# Patient Record
Sex: FEMALE | Race: WHITE | NOT HISPANIC OR LATINO
[De-identification: names, ages, dates, MRNs, and addresses within clinical notes are randomized per-mention and may not be internally consistent; named-entity substitution may affect disease eponyms.]

---

## 2017-12-20 ENCOUNTER — APPOINTMENT (OUTPATIENT)
Dept: PEDIATRIC NEUROLOGY | Facility: CLINIC | Age: 22
End: 2017-12-20
Payer: COMMERCIAL

## 2017-12-20 VITALS
HEIGHT: 64.17 IN | HEART RATE: 101 BPM | BODY MASS INDEX: 23.95 KG/M2 | DIASTOLIC BLOOD PRESSURE: 74 MMHG | SYSTOLIC BLOOD PRESSURE: 109 MMHG | WEIGHT: 140.28 LBS

## 2017-12-20 DIAGNOSIS — Z87.898 PERSONAL HISTORY OF OTHER SPECIFIED CONDITIONS: ICD-10-CM

## 2017-12-20 DIAGNOSIS — M25.512 PAIN IN LEFT SHOULDER: ICD-10-CM

## 2017-12-20 DIAGNOSIS — R76.8 OTHER SPECIFIED ABNORMAL IMMUNOLOGICAL FINDINGS IN SERUM: ICD-10-CM

## 2017-12-20 PROCEDURE — 99205 OFFICE O/P NEW HI 60 MIN: CPT

## 2017-12-20 NOTE — REASON FOR VISIT
[Seizure] : seizure [Seizure Disorder] : seizure disorder [Initial Consultation] : an initial consultation for

## 2017-12-20 NOTE — CONSULT LETTER
[FreeTextEntry2] : Dear ,\par \par I had the pleasure of evaluating NIC PARISI in the epilepsy/sleep clinic on Dec 20, 2017 for the problem of possible seizures/sleep problems as a new patient NIC PARISI, who is a 22 year right handed girl . Although  her history is well known to you, please allow me to reiterate it for the purpose of our medical records.  NIC PARISI is accompanied to today's clinic visit by her  parents. Today I spent 30 minutes reviewing information from previous records. \par \par Please see my note below\par  [FreeTextEntry3] : Thank you for allowing me to participate in NIC care. Today I spent 60 minutes with NIC and her family, including > 50% of this visit on counseling and coordinating care. Please do not hesitate to contact me in case of questions.\par \par Sincerely, \par \par \par Perry Carvajal MD\par Chief, Pediatric Neurology\par Co-Director (Neurology), Pediatric Sleep Program\par [facility]\par Professor of Pediatrics & Neurology at [facility] [employer] [facility]\par \par

## 2017-12-20 NOTE — HISTORY OF PRESENT ILLNESS
[FreeTextEntry1] : seizures started at age 9 with absences, responded well to zarontin. Treatment stopped 3 years later. Seizures returned with tonic clonic 2 years later. Depakotte and zarontin restarted. Later zarontin stopped, Depakote switched to Lamictal with good response. later, keppra added for catamenial worsening of seizures, day 21-28. Keppra stopped for side effects. Zonegran started. Currently seizure free for 14 months with this combination.

## 2017-12-20 NOTE — ASSESSMENT
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 Only responds to brand medications with breakthrough on generics.

## 2017-12-21 LAB
25(OH)D3 SERPL-MCNC: 19.4 NG/ML
ALBUMIN SERPL ELPH-MCNC: 4.7 G/DL
ALP BLD-CCNC: 47 U/L
ALT SERPL-CCNC: 8 U/L
ANION GAP SERPL CALC-SCNC: 13 MMOL/L
AST SERPL-CCNC: 12 U/L
BASOPHILS # BLD AUTO: 0.02 K/UL
BASOPHILS NFR BLD AUTO: 0.5 %
BILIRUB SERPL-MCNC: 0.2 MG/DL
BUN SERPL-MCNC: 11 MG/DL
CALCIUM SERPL-MCNC: 9.8 MG/DL
CHLORIDE SERPL-SCNC: 103 MMOL/L
CO2 SERPL-SCNC: 25 MMOL/L
CREAT SERPL-MCNC: 0.93 MG/DL
EOSINOPHIL # BLD AUTO: 0.09 K/UL
EOSINOPHIL NFR BLD AUTO: 2.1 %
GLUCOSE BS SERPL-MCNC: 72 MG/DL
HCT VFR BLD CALC: 38.9 %
HGB BLD-MCNC: 12.5 G/DL
IMM GRANULOCYTES NFR BLD AUTO: 0.2 %
LYMPHOCYTES # BLD AUTO: 1.19 K/UL
LYMPHOCYTES NFR BLD AUTO: 27.7 %
MAN DIFF?: NORMAL
MCHC RBC-ENTMCNC: 28.5 PG
MCHC RBC-ENTMCNC: 32.1 GM/DL
MCV RBC AUTO: 88.8 FL
MONOCYTES # BLD AUTO: 0.29 K/UL
MONOCYTES NFR BLD AUTO: 6.8 %
NEUTROPHILS # BLD AUTO: 2.69 K/UL
NEUTROPHILS NFR BLD AUTO: 62.7 %
PLATELET # BLD AUTO: 251 K/UL
POTASSIUM SERPL-SCNC: 4.3 MMOL/L
PROT SERPL-MCNC: 7.5 G/DL
RBC # BLD: 4.38 M/UL
RBC # FLD: 13.9 %
SODIUM SERPL-SCNC: 141 MMOL/L
WBC # FLD AUTO: 4.29 K/UL

## 2017-12-22 LAB
LAMOTRIGINE SERPL-MCNC: 10.7 MCG/ML
ZONISAMIDE SERPL-MCNC: 32 MCG/ML

## 2017-12-23 ENCOUNTER — TRANSCRIPTION ENCOUNTER (OUTPATIENT)
Age: 22
End: 2017-12-23

## 2018-06-21 ENCOUNTER — APPOINTMENT (OUTPATIENT)
Dept: PEDIATRIC NEUROLOGY | Facility: CLINIC | Age: 23
End: 2018-06-21
Payer: COMMERCIAL

## 2018-06-21 VITALS
BODY MASS INDEX: 23.32 KG/M2 | HEIGHT: 64.96 IN | WEIGHT: 139.99 LBS | DIASTOLIC BLOOD PRESSURE: 81 MMHG | HEART RATE: 101 BPM | SYSTOLIC BLOOD PRESSURE: 121 MMHG

## 2018-06-21 PROCEDURE — 99214 OFFICE O/P EST MOD 30 MIN: CPT

## 2018-06-21 RX ORDER — ZONISAMIDE 100 MG/1
100 CAPSULE ORAL
Refills: 0 | Status: DISCONTINUED | COMMUNITY
End: 2018-06-21

## 2018-06-21 NOTE — QUALITY MEASURES
[Seizure frequency] : Seizure frequency assessed: Yes [Etiology, seizure type, and epilepsy syndrome] : Etiology, seizure type, and epilepsy syndrome assessed: Yes [Side effects of anti-seizure medications] : Side effects of anti-seizure medications assessed: Yes [Safety and education around seizures] : Safety and education around seizures assessed: Yes [Issues around driving (when/if applicable)] : Issues around driving assessed: Yes [Screening for anxiety, depression] : Screening for anxiety, depression assessed: Yes [Treatment-resistant epilepsy (assessed every visit)] : Treatment-resistant epilepsy assessed every visit: Yes [Counseling for women of childbearing potential with epilepsy (including folic acid supplement) done] : Counseling for women of childbearing potential with epilepsy (including folic acid supplement) done: Yes

## 2018-06-21 NOTE — PHYSICAL EXAM
[Normal] : patient has a normal gait including toe-walking, heel-walking and tandem walking. Romberg sign is negative. [de-identified] : see HPI

## 2018-06-21 NOTE — ASSESSMENT
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 Only responds to brand medications and has had breakthrough seizures on generics. Has good seizure control on current meds, no seizures since 10/2016. Non focal neuro exam, developing normally.

## 2018-06-21 NOTE — HISTORY OF PRESENT ILLNESS
[FreeTextEntry1] : Shital is a 23 year old girl here for seizure follow up. Last seizure was on 10/5/2016. currently on Lamictal  mg in AM and Zarontin 400 mg at night. No side effects reported. \par \par Seizure Hx:\par Seizures started at age 9 with absences, responded well to Zarontin. Treatment stopped 3 years later. Seizures returned with tonic clonic 2 years later. Depakote and Zarontin restarted. Later Zarontin stopped, Depakote switched to Lamictal with good response. later, Keppra added for catamenial worsening of seizures, day 21-28. Keppra stopped for side effects. Zonegran started.

## 2018-06-21 NOTE — CONSULT LETTER
[FreeTextEntry2] : Dear ,\par \par I had the pleasure of evaluating NIC PARISI in the epilepsy/sleep clinic on Dec 20, 2017 for the problem of possible seizures/sleep problems as a new patient NIC PARISI, who is a 22 year right handed girl . Although  her history is well known to you, please allow me to reiterate it for the purpose of our medical records.  NIC PARISI is accompanied to today's clinic visit by her  parents. Today I spent 30 minutes reviewing information from previous records. \par \par Please see my note below\par  [FreeTextEntry3] : RITA Arriaza\par Certified Pedicatric Nurse Practitioner\par Pediatric Neurology\par \par Perry Carvajal MD, FAAN, FAASM\par Director, Division of Pediatric Neurology\par Co-Director, Sleep Program for Children (Neurology)\par [facility]\par \par Professor of Pediatrics & Neurology\par [facility] [employer] [facility]\par \par Director, Pediatric Neurology Service Line\par [facility]\par

## 2018-06-21 NOTE — REASON FOR VISIT
[Follow-Up Evaluation] : a follow-up evaluation for [Seizure] : seizure [Seizure Disorder] : seizure disorder [Patient] : patient [Mother] : mother [Medical Records] : medical records

## 2018-06-22 LAB
25(OH)D3 SERPL-MCNC: 23.3 NG/ML
ALBUMIN SERPL ELPH-MCNC: 4.6 G/DL
ALP BLD-CCNC: 46 U/L
ALT SERPL-CCNC: 17 U/L
ANION GAP SERPL CALC-SCNC: 15 MMOL/L
AST SERPL-CCNC: 20 U/L
BASOPHILS # BLD AUTO: 0.01 K/UL
BASOPHILS NFR BLD AUTO: 0.2 %
BILIRUB SERPL-MCNC: 0.2 MG/DL
BUN SERPL-MCNC: 14 MG/DL
CALCIUM SERPL-MCNC: 9.9 MG/DL
CHLORIDE SERPL-SCNC: 107 MMOL/L
CO2 SERPL-SCNC: 22 MMOL/L
CREAT SERPL-MCNC: 0.95 MG/DL
EOSINOPHIL # BLD AUTO: 0.08 K/UL
EOSINOPHIL NFR BLD AUTO: 1.9 %
FERRITIN SERPL-MCNC: 19 NG/ML
HCT VFR BLD CALC: 38.4 %
HGB BLD-MCNC: 12.3 G/DL
IMM GRANULOCYTES NFR BLD AUTO: 0.2 %
LYMPHOCYTES # BLD AUTO: 1.21 K/UL
LYMPHOCYTES NFR BLD AUTO: 28.4 %
MAN DIFF?: NORMAL
MCHC RBC-ENTMCNC: 28.6 PG
MCHC RBC-ENTMCNC: 32 GM/DL
MCV RBC AUTO: 89.3 FL
MONOCYTES # BLD AUTO: 0.24 K/UL
MONOCYTES NFR BLD AUTO: 5.6 %
NEUTROPHILS # BLD AUTO: 2.71 K/UL
NEUTROPHILS NFR BLD AUTO: 63.7 %
PLATELET # BLD AUTO: 243 K/UL
POTASSIUM SERPL-SCNC: 4.7 MMOL/L
PROT SERPL-MCNC: 7.1 G/DL
RBC # BLD: 4.3 M/UL
RBC # FLD: 14.6 %
SODIUM SERPL-SCNC: 144 MMOL/L
WBC # FLD AUTO: 4.26 K/UL

## 2018-06-25 ENCOUNTER — OTHER (OUTPATIENT)
Age: 23
End: 2018-06-25

## 2018-06-25 LAB
LAMOTRIGINE SERPL-MCNC: 11.1 MCG/ML
ZONISAMIDE SERPL-MCNC: 28 MCG/ML

## 2018-07-17 ENCOUNTER — RESULT REVIEW (OUTPATIENT)
Age: 23
End: 2018-07-17

## 2018-07-24 ENCOUNTER — RX RENEWAL (OUTPATIENT)
Age: 23
End: 2018-07-24

## 2018-07-30 ENCOUNTER — APPOINTMENT (OUTPATIENT)
Dept: PEDIATRIC NEUROLOGY | Facility: CLINIC | Age: 23
End: 2018-07-30

## 2018-08-23 ENCOUNTER — APPOINTMENT (OUTPATIENT)
Dept: PEDIATRIC NEUROLOGY | Facility: CLINIC | Age: 23
End: 2018-08-23
Payer: COMMERCIAL

## 2018-08-23 PROCEDURE — 95816 EEG AWAKE AND DROWSY: CPT

## 2018-09-06 ENCOUNTER — RESULT REVIEW (OUTPATIENT)
Age: 23
End: 2018-09-06

## 2018-12-20 ENCOUNTER — APPOINTMENT (OUTPATIENT)
Dept: PEDIATRIC NEUROLOGY | Facility: CLINIC | Age: 23
End: 2018-12-20

## 2019-01-25 ENCOUNTER — RX RENEWAL (OUTPATIENT)
Age: 24
End: 2019-01-25

## 2019-01-25 ENCOUNTER — MEDICATION RENEWAL (OUTPATIENT)
Age: 24
End: 2019-01-25

## 2019-03-07 ENCOUNTER — APPOINTMENT (OUTPATIENT)
Dept: PEDIATRIC NEUROLOGY | Facility: CLINIC | Age: 24
End: 2019-03-07
Payer: COMMERCIAL

## 2019-03-07 VITALS
BODY MASS INDEX: 23.66 KG/M2 | DIASTOLIC BLOOD PRESSURE: 81 MMHG | HEIGHT: 64.96 IN | HEART RATE: 116 BPM | SYSTOLIC BLOOD PRESSURE: 117 MMHG | WEIGHT: 142 LBS

## 2019-03-07 PROCEDURE — 99214 OFFICE O/P EST MOD 30 MIN: CPT

## 2019-03-08 LAB
25(OH)D3 SERPL-MCNC: 10.1 NG/ML
ALBUMIN SERPL ELPH-MCNC: 5 G/DL
ALP BLD-CCNC: 53 U/L
ALT SERPL-CCNC: 15 U/L
ANION GAP SERPL CALC-SCNC: 15 MMOL/L
AST SERPL-CCNC: 16 U/L
BASOPHILS # BLD AUTO: 0.03 K/UL
BASOPHILS NFR BLD AUTO: 0.6 %
BILIRUB SERPL-MCNC: 0.2 MG/DL
BUN SERPL-MCNC: 10 MG/DL
CALCIUM SERPL-MCNC: 10.3 MG/DL
CHLORIDE SERPL-SCNC: 104 MMOL/L
CO2 SERPL-SCNC: 20 MMOL/L
CREAT SERPL-MCNC: 1.07 MG/DL
EOSINOPHIL # BLD AUTO: 0.07 K/UL
EOSINOPHIL NFR BLD AUTO: 1.5 %
FERRITIN SERPL-MCNC: 62 NG/ML
HCT VFR BLD CALC: 43.2 %
HGB BLD-MCNC: 13.5 G/DL
IMM GRANULOCYTES NFR BLD AUTO: 0.4 %
LYMPHOCYTES # BLD AUTO: 1.56 K/UL
LYMPHOCYTES NFR BLD AUTO: 32.6 %
MAN DIFF?: NORMAL
MCHC RBC-ENTMCNC: 27.9 PG
MCHC RBC-ENTMCNC: 31.3 GM/DL
MCV RBC AUTO: 89.3 FL
MONOCYTES # BLD AUTO: 0.45 K/UL
MONOCYTES NFR BLD AUTO: 9.4 %
NEUTROPHILS # BLD AUTO: 2.65 K/UL
NEUTROPHILS NFR BLD AUTO: 55.5 %
PLATELET # BLD AUTO: 330 K/UL
POTASSIUM SERPL-SCNC: 4.4 MMOL/L
PROT SERPL-MCNC: 7.4 G/DL
RBC # BLD: 4.84 M/UL
RBC # FLD: 14 %
SODIUM SERPL-SCNC: 139 MMOL/L
WBC # FLD AUTO: 4.78 K/UL

## 2019-03-08 NOTE — HISTORY OF PRESENT ILLNESS
[Headache] : headache [FreeTextEntry1] : Shital is a 23 year old girl here for seizure follow up and headaches/ vomiting. Last seizure was on 10/5/2016. Currently on Lamictal  mg in AM (takes it at 2:00 pm) and Zonegran 400 mg at night. No side effects reported. \par For past 3 months she has been getting dizzy for about 15 minutes and then starts vomiting at night right before taking the Zonegran after dinner. Mother gives Zofran but it takes 2 doses within an hour before it will stop. She will get a headache after vomiting. This has been occurring once monthly for past 3 months and did occur 1.5 years ago as well. She will be up all night vomiting on the days it occurs. Episodes occur around her menstrual period.\par \par \par Seizure Hx:\par Seizures started at age 9 with absences, responded well to Zarontin. Treatment stopped 3 years later. Seizures returned with tonic clonic 2 years later. Depakote and Zarontin restarted. Later Zarontin stopped, Depakote switched to Lamictal with good response. later, Keppra added for catamenial worsening of seizures, day 21-28. Keppra stopped for side effects. Zonegran started. [de-identified] : occasional with vomiting episodes

## 2019-03-08 NOTE — PHYSICAL EXAM

## 2019-03-08 NOTE — QUALITY MEASURES
[Seizure frequency] : Seizure frequency: Yes [Etiology, seizure type, and epilepsy syndrome] : Etiology, seizure type, and epilepsy syndrome: Yes [Side effects of anti-seizure medications] : Side effects of anti-seizure medications: Yes [Safety and education around seizures] : Safety and education around seizures: Yes [Issues around driving] : Issues around driving: Yes [Screening for anxiety, depression] : Screening for anxiety, depression: Yes [Treatment-resistant epilepsy (every visit)] : Treatment-resistant epilepsy (every visit): Yes [Adherence to medication(s)] : Adherence to medication(s): Yes [Counseling for women of childbearing potential with epilepsy (including folic acid supplement)] : Counseling for women of childbearing potential with epilepsy (including folic acid supplement): Yes [25 Hydroxy Vitamin D level assessed and Vitamin D3 ordered] : 25 Hydroxy Vitamin D level assessed and Vitamin D3 ordered: Yes [Classification of primary headache syndrome based on latest version of International Classification of  Headache Disorders was performed] : Classification of primary headache syndrome based on latest version of International Classification of Headache Disorders was performed: Yes [Functional disability based on clinical history and/or age appropriate disability scale assessed] : Functional disability based on clinical history and/or age appropriate disability scale assessed: Yes [Overuse of OTC and prescribed analgesics assessed] : Overuse of OTC and prescribed analgesics assessed: Yes [Lifestyle factors including diet, exercise and sleep hygiene discussed] : Lifestyle factors including diet, exercise and sleep hygiene discussed: Yes [Referral to behavioral health for frequent headaches discussed] : Referral to behavioral health for frequent headaches discussed: Yes [Treatment plan for headache including  pharmacological (abortive and preventive) and nonpharmacological (nutraceutical and bio-behavioral) interventions] : Treatment plan for headache including  pharmacological (abortive and preventive) and nonpharmacological (nutraceutical and bio-behavioral) interventions: Yes [Options for adjunctive therapy (Neurostimulation, CBD, Dietary Therapy, Epilepsy Surgery)] : Options for adjunctive therapy (Neurostimulation, CBD, Dietary Therapy, Epilepsy Surgery): Not Applicable

## 2019-03-08 NOTE — CONSULT LETTER
[Courtesy Letter:] : I had the pleasure of seeing your patient, [unfilled], in my office today. [Please see my note below.] : Please see my note below. [Sincerely,] : Sincerely, [FreeTextEntry3] : RITA Arriaza\par Certified Pedicatric Nurse Practitioner\par Pediatric Neurology\par \par Perry Carvajal MD, FAAN, FAASM\par Director, Division of Pediatric Neurology\par Co-Director, Sleep Program for Children (Neurology)\par [facility]\par \par Professor of Pediatrics & Neurology\par [facility] [employer] [facility]\par \par Director, Pediatric Neurology Service Line\par [facility]\par

## 2019-03-11 ENCOUNTER — CLINICAL ADVICE (OUTPATIENT)
Age: 24
End: 2019-03-11

## 2019-03-12 LAB
LAMOTRIGINE SERPL-MCNC: 11.5 MCG/ML
ZONISAMIDE SERPL-MCNC: 32 MCG/ML

## 2019-03-19 ENCOUNTER — MEDICATION RENEWAL (OUTPATIENT)
Age: 24
End: 2019-03-19

## 2019-03-21 ENCOUNTER — RX RENEWAL (OUTPATIENT)
Age: 24
End: 2019-03-21

## 2019-04-29 ENCOUNTER — MEDICATION RENEWAL (OUTPATIENT)
Age: 24
End: 2019-04-29

## 2019-08-02 ENCOUNTER — MOBILE ON CALL (OUTPATIENT)
Age: 24
End: 2019-08-02

## 2019-08-02 ENCOUNTER — MEDICATION RENEWAL (OUTPATIENT)
Age: 24
End: 2019-08-02

## 2019-08-26 ENCOUNTER — APPOINTMENT (OUTPATIENT)
Dept: PEDIATRIC NEUROLOGY | Facility: CLINIC | Age: 24
End: 2019-08-26
Payer: COMMERCIAL

## 2019-08-26 ENCOUNTER — APPOINTMENT (OUTPATIENT)
Dept: PEDIATRIC NEUROLOGY | Facility: CLINIC | Age: 24
End: 2019-08-26

## 2019-08-26 VITALS
SYSTOLIC BLOOD PRESSURE: 117 MMHG | BODY MASS INDEX: 21.99 KG/M2 | HEART RATE: 92 BPM | WEIGHT: 132 LBS | DIASTOLIC BLOOD PRESSURE: 79 MMHG | HEIGHT: 64.96 IN

## 2019-08-26 DIAGNOSIS — G43.909 MIGRAINE, UNSPECIFIED, NOT INTRACTABLE, W/OUT STATUS MIGRAINOSUS: ICD-10-CM

## 2019-08-26 DIAGNOSIS — R42 DIZZINESS AND GIDDINESS: ICD-10-CM

## 2019-08-26 DIAGNOSIS — R11.2 NAUSEA WITH VOMITING, UNSPECIFIED: ICD-10-CM

## 2019-08-26 PROCEDURE — 99214 OFFICE O/P EST MOD 30 MIN: CPT

## 2019-08-26 RX ORDER — ZONISAMIDE 100 MG/1
100 CAPSULE ORAL
Qty: 120 | Refills: 5 | Status: ACTIVE | COMMUNITY
Start: 2017-12-20 | End: 1900-01-01

## 2019-08-26 NOTE — BIRTH HISTORY
[At Term] : at term [Normal Vaginal Route] : by normal vaginal route [place] [Age Appropriate] : age appropriate developmental milestones met [None] : there were no delivery complications

## 2019-08-27 PROBLEM — R42 DIZZINESS: Status: ACTIVE | Noted: 2019-03-08

## 2019-08-27 PROBLEM — R11.2 NAUSEA AND VOMITING: Status: ACTIVE | Noted: 2019-03-08

## 2019-08-27 PROBLEM — G43.909 MIGRAINE: Status: ACTIVE | Noted: 2019-03-08

## 2019-08-27 LAB
25(OH)D3 SERPL-MCNC: 24.3 NG/ML
ALBUMIN SERPL ELPH-MCNC: 4.8 G/DL
ALP BLD-CCNC: 49 U/L
ALT SERPL-CCNC: 15 U/L
ANION GAP SERPL CALC-SCNC: 13 MMOL/L
AST SERPL-CCNC: 12 U/L
BASOPHILS # BLD AUTO: 0.01 K/UL
BASOPHILS NFR BLD AUTO: 0.2 %
BILIRUB SERPL-MCNC: <0.2 MG/DL
BUN SERPL-MCNC: 10 MG/DL
CALCIUM SERPL-MCNC: 10.3 MG/DL
CHLORIDE SERPL-SCNC: 104 MMOL/L
CHOLEST SERPL-MCNC: 199 MG/DL
CHOLEST/HDLC SERPL: 4.2 RATIO
CO2 SERPL-SCNC: 22 MMOL/L
CREAT SERPL-MCNC: 0.96 MG/DL
EOSINOPHIL # BLD AUTO: 0.05 K/UL
EOSINOPHIL NFR BLD AUTO: 1.1 %
HCT VFR BLD CALC: 41.5 %
HDLC SERPL-MCNC: 48 MG/DL
HGB BLD-MCNC: 13.2 G/DL
IMM GRANULOCYTES NFR BLD AUTO: 0.2 %
LDLC SERPL CALC-MCNC: 132 MG/DL
LYMPHOCYTES # BLD AUTO: 1.44 K/UL
LYMPHOCYTES NFR BLD AUTO: 30.4 %
MAN DIFF?: NORMAL
MCHC RBC-ENTMCNC: 29.1 PG
MCHC RBC-ENTMCNC: 31.8 GM/DL
MCV RBC AUTO: 91.4 FL
MONOCYTES # BLD AUTO: 0.35 K/UL
MONOCYTES NFR BLD AUTO: 7.4 %
NEUTROPHILS # BLD AUTO: 2.87 K/UL
NEUTROPHILS NFR BLD AUTO: 60.7 %
PLATELET # BLD AUTO: 248 K/UL
POTASSIUM SERPL-SCNC: 4.6 MMOL/L
PROT SERPL-MCNC: 7.4 G/DL
RBC # BLD: 4.54 M/UL
RBC # FLD: 14.1 %
SODIUM SERPL-SCNC: 139 MMOL/L
TRIGL SERPL-MCNC: 95 MG/DL
WBC # FLD AUTO: 4.73 K/UL

## 2019-08-27 NOTE — CONSULT LETTER
[Sincerely,] : Sincerely, [Courtesy Letter:] : I had the pleasure of seeing your patient, [unfilled], in my office today. [Please see my note below.] : Please see my note below. [FreeTextEntry3] : RITA Arriaza\par Certified Pedicatric Nurse Practitioner\par Pediatric Neurology\par \par Perry Carvajal MD, FAAN, FAASM\par Director, Division of Pediatric Neurology\par Co-Director, Sleep Program for Children (Neurology)\par [facility]\par \par Professor of Pediatrics & Neurology\par [facility] [employer] [facility]\par \par Director, Pediatric Neurology Service Line\par [facility]\par

## 2019-08-27 NOTE — ASSESSMENT
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 Only responds to brand medications and has had breakthrough seizures on generics. Has good seizure control on current meds, no seizures since 10/2016. She has been having almost monthly migraines along with vomiting and aura. Non focal neuro exam, developing normally.

## 2019-08-27 NOTE — HISTORY OF PRESENT ILLNESS
[Headache] : headache [FreeTextEntry1] : Shital is a 23 year old girl here for seizure follow up and headaches/ vomiting. Last seizure was on 10/5/2016. Currently on Lamictal  mg in AM and Zonegran 400 mg at night. No side effects reported. \miranda Has not had an episode of dizziness and vomiting for two of her cycles since last visit which was in March 2019.\par Frovatriptan along with Zofran helps her feel better with a migraine that occurs by her cycle.\par no seizure since Oct 2016\par \par For past 6 months she has been getting dizzy for about 15 minutes and then starts vomiting at night right before taking the Zonegran after dinner. Mother gives Zofran but it takes 2 doses within an hour before it will stop. She will get a headache after vomiting. This has been occurring once monthly for past 3 months and did occur 1.5 years ago as well. She will be up all night vomiting on the days it occurs. Episodes occur around her menstrual period.\par \par \par Seizure Hx:\par Seizures started at age 9 with absences, responded well to Zarontin. Treatment stopped 3 years later. Seizures returned with tonic clonic 2 years later. Depakote and Zarontin restarted. Later Zarontin stopped, Depakote switched to Lamictal with good response. later, Keppra added for catamenial worsening of seizures, day 21-28. Keppra stopped for side effects. Zonegran started. [de-identified] : occasional with vomiting episodes

## 2019-08-27 NOTE — PHYSICAL EXAM
[Cranial Nerves Optic (II)] : visual acuity intact bilaterally,  visual fields full to confrontation, pupils equal round and reactive to light [Cranial Nerves Oculomotor (III)] : extraocular motion intact [Cranial Nerves Trigeminal (V)] : facial sensation intact symmetrically [Cranial Nerves Facial (VII)] : face symmetrical [Cranial Nerves Vestibulocochlear (VIII)] : hearing was intact bilaterally [Cranial Nerves Accessory (XI - Cranial And Spinal)] : head turning and shoulder shrug symmetric [Cranial Nerves Glossopharyngeal (IX)] : tongue and palate midline [Cranial Nerves Hypoglossal (XII)] : there was no tongue deviation with protrusion [Normal] : patient has a normal gait including toe-walking, heel-walking and tandem walking. Romberg sign is negative. [de-identified] : see HPI

## 2019-08-27 NOTE — QUALITY MEASURES
[Classification of primary headache syndrome based on latest version of International Classification of  Headache Disorders was performed] : Classification of primary headache syndrome based on latest version of International Classification of Headache Disorders was performed: Yes [Overuse of OTC and prescribed analgesics assessed] : Overuse of OTC and prescribed analgesics assessed: Yes [Lifestyle factors including diet, exercise and sleep hygiene discussed] : Lifestyle factors including diet, exercise and sleep hygiene discussed: Yes [Treatment plan for headache including  pharmacological (abortive and preventive) and nonpharmacological (nutraceutical and bio-behavioral) interventions] : Treatment plan for headache including  pharmacological (abortive and preventive) and nonpharmacological (nutraceutical and bio-behavioral) interventions: Yes [Referral to behavioral health for frequent headaches discussed] : Referral to behavioral health for frequent headaches discussed: Yes [Seizure frequency] : Seizure frequency: Yes [Etiology, seizure type, and epilepsy syndrome] : Etiology, seizure type, and epilepsy syndrome: Yes [Safety and education around seizures] : Safety and education around seizures: Yes [Issues around driving] : Issues around driving: Yes [Side effects of anti-seizure medications] : Side effects of anti-seizure medications: Yes [Treatment-resistant epilepsy (every visit)] : Treatment-resistant epilepsy (every visit): Yes [Screening for anxiety, depression] : Screening for anxiety, depression: Yes [Counseling for women of childbearing potential with epilepsy (including folic acid supplement)] : Counseling for women of childbearing potential with epilepsy (including folic acid supplement): Yes [Adherence to medication(s)] : Adherence to medication(s): Yes [25 Hydroxy Vitamin D level assessed and Vitamin D3 ordered] : 25 Hydroxy Vitamin D level assessed and Vitamin D3 ordered: Yes [Functional disability based on clinical history and/or age appropriate disability scale assessed] : Functional disability based on clinical history and/or age appropriate disability scale assessed: Yes [Options for adjunctive therapy (Neurostimulation, CBD, Dietary Therapy, Epilepsy Surgery)] : Options for adjunctive therapy (Neurostimulation, CBD, Dietary Therapy, Epilepsy Surgery): Not Applicable

## 2019-08-28 ENCOUNTER — TRANSCRIPTION ENCOUNTER (OUTPATIENT)
Age: 24
End: 2019-08-28

## 2019-08-28 LAB
LAMOTRIGINE SERPL-MCNC: 11.5 MCG/ML
ZONISAMIDE SERPL-MCNC: 32 MCG/ML

## 2020-01-10 ENCOUNTER — RX RENEWAL (OUTPATIENT)
Age: 25
End: 2020-01-10

## 2020-02-25 ENCOUNTER — APPOINTMENT (OUTPATIENT)
Dept: PEDIATRIC NEUROLOGY | Facility: CLINIC | Age: 25
End: 2020-02-25

## 2020-12-15 ENCOUNTER — EMERGENCY (EMERGENCY)
Facility: HOSPITAL | Age: 25
LOS: 1 days | End: 2020-12-15
Attending: EMERGENCY MEDICINE
Payer: COMMERCIAL

## 2020-12-15 VITALS
TEMPERATURE: 98 F | DIASTOLIC BLOOD PRESSURE: 90 MMHG | RESPIRATION RATE: 20 BRPM | HEART RATE: 126 BPM | HEIGHT: 65 IN | WEIGHT: 139.99 LBS | SYSTOLIC BLOOD PRESSURE: 143 MMHG | OXYGEN SATURATION: 99 %

## 2020-12-15 LAB
ALBUMIN SERPL ELPH-MCNC: 4.9 G/DL — SIGNIFICANT CHANGE UP (ref 3.3–5)
ALP SERPL-CCNC: 58 U/L — SIGNIFICANT CHANGE UP (ref 40–120)
ALT FLD-CCNC: 9 U/L — LOW (ref 10–45)
ANION GAP SERPL CALC-SCNC: 14 MMOL/L — SIGNIFICANT CHANGE UP (ref 5–17)
APPEARANCE UR: CLEAR — SIGNIFICANT CHANGE UP
APTT BLD: 28.3 SEC — SIGNIFICANT CHANGE UP (ref 27.5–35.5)
AST SERPL-CCNC: 15 U/L — SIGNIFICANT CHANGE UP (ref 10–40)
BASOPHILS # BLD AUTO: 0.02 K/UL — SIGNIFICANT CHANGE UP (ref 0–0.2)
BASOPHILS NFR BLD AUTO: 0.2 % — SIGNIFICANT CHANGE UP (ref 0–2)
BILIRUB SERPL-MCNC: 0.1 MG/DL — LOW (ref 0.2–1.2)
BILIRUB UR-MCNC: NEGATIVE — SIGNIFICANT CHANGE UP
BUN SERPL-MCNC: 11 MG/DL — SIGNIFICANT CHANGE UP (ref 7–23)
CALCIUM SERPL-MCNC: 10.2 MG/DL — SIGNIFICANT CHANGE UP (ref 8.4–10.5)
CHLORIDE SERPL-SCNC: 104 MMOL/L — SIGNIFICANT CHANGE UP (ref 96–108)
CO2 SERPL-SCNC: 19 MMOL/L — LOW (ref 22–31)
COLOR SPEC: SIGNIFICANT CHANGE UP
CREAT SERPL-MCNC: 1.1 MG/DL — SIGNIFICANT CHANGE UP (ref 0.5–1.3)
DIFF PNL FLD: ABNORMAL
EOSINOPHIL # BLD AUTO: 0.01 K/UL — SIGNIFICANT CHANGE UP (ref 0–0.5)
EOSINOPHIL NFR BLD AUTO: 0.1 % — SIGNIFICANT CHANGE UP (ref 0–6)
GLUCOSE SERPL-MCNC: 102 MG/DL — HIGH (ref 70–99)
GLUCOSE UR QL: NEGATIVE — SIGNIFICANT CHANGE UP
HCG SERPL-ACNC: <2 MIU/ML — SIGNIFICANT CHANGE UP
HCT VFR BLD CALC: 40.3 % — SIGNIFICANT CHANGE UP (ref 34.5–45)
HGB BLD-MCNC: 13 G/DL — SIGNIFICANT CHANGE UP (ref 11.5–15.5)
IMM GRANULOCYTES NFR BLD AUTO: 0.8 % — SIGNIFICANT CHANGE UP (ref 0–1.5)
INR BLD: 1.15 RATIO — SIGNIFICANT CHANGE UP (ref 0.88–1.16)
KETONES UR-MCNC: NEGATIVE — SIGNIFICANT CHANGE UP
LEUKOCYTE ESTERASE UR-ACNC: NEGATIVE — SIGNIFICANT CHANGE UP
LIDOCAIN IGE QN: 26 U/L — SIGNIFICANT CHANGE UP (ref 7–60)
LYMPHOCYTES # BLD AUTO: 1.08 K/UL — SIGNIFICANT CHANGE UP (ref 1–3.3)
LYMPHOCYTES # BLD AUTO: 11.7 % — LOW (ref 13–44)
MCHC RBC-ENTMCNC: 29.3 PG — SIGNIFICANT CHANGE UP (ref 27–34)
MCHC RBC-ENTMCNC: 32.3 GM/DL — SIGNIFICANT CHANGE UP (ref 32–36)
MCV RBC AUTO: 91 FL — SIGNIFICANT CHANGE UP (ref 80–100)
MONOCYTES # BLD AUTO: 0.64 K/UL — SIGNIFICANT CHANGE UP (ref 0–0.9)
MONOCYTES NFR BLD AUTO: 6.9 % — SIGNIFICANT CHANGE UP (ref 2–14)
NEUTROPHILS # BLD AUTO: 7.4 K/UL — SIGNIFICANT CHANGE UP (ref 1.8–7.4)
NEUTROPHILS NFR BLD AUTO: 80.3 % — HIGH (ref 43–77)
NITRITE UR-MCNC: NEGATIVE — SIGNIFICANT CHANGE UP
NRBC # BLD: 0 /100 WBCS — SIGNIFICANT CHANGE UP (ref 0–0)
PH UR: 6 — SIGNIFICANT CHANGE UP (ref 5–8)
PLATELET # BLD AUTO: 227 K/UL — SIGNIFICANT CHANGE UP (ref 150–400)
POTASSIUM SERPL-MCNC: 4 MMOL/L — SIGNIFICANT CHANGE UP (ref 3.5–5.3)
POTASSIUM SERPL-SCNC: 4 MMOL/L — SIGNIFICANT CHANGE UP (ref 3.5–5.3)
PROT SERPL-MCNC: 7.8 G/DL — SIGNIFICANT CHANGE UP (ref 6–8.3)
PROT UR-MCNC: NEGATIVE — SIGNIFICANT CHANGE UP
PROTHROM AB SERPL-ACNC: 13.7 SEC — HIGH (ref 10.6–13.6)
RBC # BLD: 4.43 M/UL — SIGNIFICANT CHANGE UP (ref 3.8–5.2)
RBC # FLD: 13.3 % — SIGNIFICANT CHANGE UP (ref 10.3–14.5)
SODIUM SERPL-SCNC: 137 MMOL/L — SIGNIFICANT CHANGE UP (ref 135–145)
SP GR SPEC: 1.01 — SIGNIFICANT CHANGE UP (ref 1.01–1.02)
UROBILINOGEN FLD QL: NEGATIVE — SIGNIFICANT CHANGE UP
WBC # BLD: 9.22 K/UL — SIGNIFICANT CHANGE UP (ref 3.8–10.5)
WBC # FLD AUTO: 9.22 K/UL — SIGNIFICANT CHANGE UP (ref 3.8–10.5)

## 2020-12-15 PROCEDURE — 99220: CPT

## 2020-12-15 PROCEDURE — 76775 US EXAM ABDO BACK WALL LIM: CPT | Mod: 26

## 2020-12-15 RX ORDER — ACETAMINOPHEN 500 MG
975 TABLET ORAL ONCE
Refills: 0 | Status: DISCONTINUED | OUTPATIENT
Start: 2020-12-15 | End: 2020-12-15

## 2020-12-15 RX ORDER — MORPHINE SULFATE 50 MG/1
4 CAPSULE, EXTENDED RELEASE ORAL ONCE
Refills: 0 | Status: DISCONTINUED | OUTPATIENT
Start: 2020-12-15 | End: 2020-12-15

## 2020-12-15 RX ORDER — ONDANSETRON 8 MG/1
4 TABLET, FILM COATED ORAL ONCE
Refills: 0 | Status: COMPLETED | OUTPATIENT
Start: 2020-12-15 | End: 2020-12-15

## 2020-12-15 RX ORDER — KETOROLAC TROMETHAMINE 30 MG/ML
15 SYRINGE (ML) INJECTION ONCE
Refills: 0 | Status: DISCONTINUED | OUTPATIENT
Start: 2020-12-15 | End: 2020-12-15

## 2020-12-15 RX ORDER — SODIUM CHLORIDE 9 MG/ML
1000 INJECTION, SOLUTION INTRAVENOUS ONCE
Refills: 0 | Status: COMPLETED | OUTPATIENT
Start: 2020-12-15 | End: 2020-12-15

## 2020-12-15 RX ORDER — SODIUM CHLORIDE 9 MG/ML
1000 INJECTION INTRAMUSCULAR; INTRAVENOUS; SUBCUTANEOUS
Refills: 0 | Status: DISCONTINUED | OUTPATIENT
Start: 2020-12-15 | End: 2020-12-19

## 2020-12-15 RX ADMIN — Medication 15 MILLIGRAM(S): at 18:56

## 2020-12-15 RX ADMIN — MORPHINE SULFATE 4 MILLIGRAM(S): 50 CAPSULE, EXTENDED RELEASE ORAL at 18:56

## 2020-12-15 RX ADMIN — SODIUM CHLORIDE 130 MILLILITER(S): 9 INJECTION INTRAMUSCULAR; INTRAVENOUS; SUBCUTANEOUS at 21:20

## 2020-12-15 RX ADMIN — SODIUM CHLORIDE 1000 MILLILITER(S): 9 INJECTION, SOLUTION INTRAVENOUS at 14:11

## 2020-12-15 RX ADMIN — Medication 15 MILLIGRAM(S): at 15:17

## 2020-12-15 RX ADMIN — ONDANSETRON 4 MILLIGRAM(S): 8 TABLET, FILM COATED ORAL at 14:11

## 2020-12-15 RX ADMIN — MORPHINE SULFATE 4 MILLIGRAM(S): 50 CAPSULE, EXTENDED RELEASE ORAL at 14:11

## 2020-12-15 RX ADMIN — MORPHINE SULFATE 4 MILLIGRAM(S): 50 CAPSULE, EXTENDED RELEASE ORAL at 17:55

## 2020-12-15 RX ADMIN — MORPHINE SULFATE 4 MILLIGRAM(S): 50 CAPSULE, EXTENDED RELEASE ORAL at 21:41

## 2020-12-15 RX ADMIN — Medication 15 MILLIGRAM(S): at 18:57

## 2020-12-15 RX ADMIN — ONDANSETRON 4 MILLIGRAM(S): 8 TABLET, FILM COATED ORAL at 22:08

## 2020-12-15 NOTE — ED PROVIDER NOTE - PHYSICAL EXAMINATION
Attending MD Cheryl:    Gen:  awake and alert, uncomfortable appearing, oriented x 3  Neck: supple, no swelling, trachea midline  CV: heart with reg rhythm, no obvious murmur appreciated   Resp: CTAB, breathing comfortably  Abd: soft, NT, ND, no CVAT b/l  Extremities: extremities warm to the touch, no peripheral edema   Msk: no extremity deformities or bony tenderness  Pysch: appropriate affect    Neuro: moves all extremities spontaneously, no gross motor or sensory deficits

## 2020-12-15 NOTE — ED CDU PROVIDER INITIAL DAY NOTE - PHYSICAL EXAMINATION
GEN: Well Appearing, Nontoxic, NAD  HEENT: NC/AT, Symm Facies. PERRL, EOMI  CV: No JVD/Bruits or stridor;  +S1S2, RRR w/o m/g/r  RESP: CTAB w/o w/r/r  ABD: Soft, nt/nd, +BS. No guarding/rebound. No RUQ tender, + LCVAT  EXT/MSK: No lower extremity edema or calf tenderness.  FROMx4  PSYCH: Normal affect   Neuro: Grossly intact, AOX3 with normal speech, No focal deficits

## 2020-12-15 NOTE — ED PROVIDER NOTE - ATTENDING CONTRIBUTION TO CARE
Attending MD Luna:  I personally have seen and examined this patient.  Resident note reviewed and agree on plan of care and except where noted.  See HPI, PE, and MDM for details.

## 2020-12-15 NOTE — ED CDU PROVIDER INITIAL DAY NOTE - OBJECTIVE STATEMENT
26 y/o F with PMH epilepsy presents to ED with 1 day of sharp L flank pain with radiation to lower abdomen. Pain associated with nausea, no vomiting.   Denies fever, cough, chest pain, SOB, vomiting, sick contacts, recent travel, diarrhea, vaginal bleeding, vaginal discharge, dysuria, hematuria, increased urinary frequency, abdominal surgeries, calf pain, calf swelling.   ED course: US renal- "Mild left hydronephrosis. Nonvisualization of the left ureteral jet, raises the possibility of obstruction." Urine +small blood. Patient given IVF, IV Morphine, IV Toradol with relief of pain. Able to PO tolerate crackers and water in the ED. Plan for IVF, pain control, serial abdominal exams in CDU. 26 y/o F with PMH epilepsy presents to ED with 1 day of sharp L flank pain with radiation to lower abdomen. Pain associated with nausea, no vomiting.   Denies fever, cough, chest pain, SOB, vomiting, sick contacts, recent travel, diarrhea, vaginal bleeding, vaginal discharge, dysuria, hematuria, increased urinary frequency, abdominal surgeries, calf pain, calf swelling.   ED course: US renal- "Mild left hydronephrosis. Nonvisualization of the left ureteral jet, raises the possibility of obstruction." Urine +small blood. Patient given IVF, IV Morphine, IV Toradol with relief of pain. Able to PO tolerate crackers and water in the ED. Per ED note, initial plan was for TVUS but was cancelled as ultrasound results supported suspicion for ureteral stone and ovarian torsion not suspected.  Plan for IVF, pain control, serial abdominal exams in CDU. 24 y/o F with PMH epilepsy presents to ED with 1 day of sharp L flank pain with radiation to lower abdomen. Pain associated with nausea, no vomiting.   Denies fever, cough, chest pain, SOB, vomiting, sick contacts, recent travel, diarrhea, dizziness, headache, lightheadedness, vaginal bleeding, vaginal discharge, dysuria, hematuria, increased urinary frequency, abdominal surgeries, calf pain, calf swelling.   ED course: US renal- "Mild left hydronephrosis. Nonvisualization of the left ureteral jet, raises the possibility of obstruction." Urine +small blood. Patient given IVF, IV Morphine, IV Toradol with relief of pain. Able to PO tolerate crackers and water in the ED. Per ED note, initial plan was for TVUS but was cancelled as ultrasound results supported suspicion for ureteral stone and ovarian torsion not suspected.  Plan for IVF, pain control, serial abdominal exams in CDU.

## 2020-12-15 NOTE — ED CDU PROVIDER INITIAL DAY NOTE - MEDICAL DECISION MAKING DETAILS
Abd pain/renal colic. IVf, pain meds reassess. If not improved in am C/S Urology, otherwise opt uro followup  Fernando Bunch MD, Facep

## 2020-12-15 NOTE — ED PROVIDER NOTE - PROGRESS NOTE DETAILS
Attending MD Luna: US with mild L hydro which supports suspicion for ureteral stone, ovarian torsion not suspected so TVUS cancelled. Patient feels much better with IV toradol. Pending UA and pain reassessment, signed out to Dr. Bunch pending pain re-evaluation Received signout Dr Luna young adult female with renal colic. Now with recurrent pain. Plan CDU for pain control obs  Fernando Bunch MD, Facep

## 2020-12-15 NOTE — ED PROVIDER NOTE - OBJECTIVE STATEMENT
25F, hx epilepsy, presents with back pain 25F, hx epilepsy, presents with left back pain x hours. Pain is sharp, associated with nausea but no emesis. No f/c, no urinary symptoms, no vaginal bleeding.

## 2020-12-15 NOTE — ED CDU PROVIDER INITIAL DAY NOTE - NS ED ROS FT
Constitutional: No fever or chills  Eyes: No visual changes  CV: No chest pain or lower extremity edema  Resp: No SOB no cough  GI: +abd pain +flank pain L. + nausea NO vomiting. No diarrhea.   : No dysuria, hematuria.   MSK: No musculoskeletal pain  Skin: No rash  Psych: No complaints   Neuro: No headache. No numbness or tingling. No weakness.

## 2020-12-15 NOTE — ED CDU PROVIDER DISPOSITION NOTE - CARE PROVIDER_API CALL
Ricky Felder  UROLOGY  [address] [place] [address], [place] [address]  Phone: [phone]  Fax: [phone]  Follow Up Time:

## 2020-12-15 NOTE — ED ADULT NURSE NOTE - OBJECTIVE STATEMENT
26 y/o female presenting to the ER c/o L. Flank pain radiating to the LLQ starting this morning. Pt reports similar L. Flank pain on Sunday and took advil and the pain subsided. Pt reports today pain started in L. Flank and slowly started to radiate to the LLQ, pain described as "shooting/stabbing" and constant, pt endorses nausea associated w/ pain. Pt presented to ER tachycardic ~120, denies chest pain/SOB/dyspnea/lightheadedness/dizziness, pt sittitng up visibly in pain. PMHx epilepsy. Pt denies chest pain, SOB/difficulty breathing, fever/chills, HA, abd pain, vomiting/diarrhea, lightheadedness/dizziness, numbness/tingling  s/s. Pt A&Ox3, heart sounds normal, breathing spontaneous and unlabored, lung sounds clear B/L, abd soft, nondistended/nontender, IV locked and patent, pt instructed on use of call bell, bed locked and in lowest position.

## 2020-12-15 NOTE — ED CDU PROVIDER DISPOSITION NOTE - PATIENT PORTAL LINK FT
You can access the FollowMyHealth Patient Portal offered by [facility] by registering at the following website: [email or web address]. By joining [facility]’s FollowMyHealth portal, you will also be able to view your health information using other applications (apps) compatible with our system.

## 2020-12-15 NOTE — ED CDU PROVIDER INITIAL DAY NOTE - FAMILY HISTORY
Family history of hypertension     Mother  Still living? Yes, Estimated age: Age Unknown  Family history of factor V deficiency, Age at diagnosis: Age Unknown

## 2020-12-15 NOTE — ED CDU PROVIDER DISPOSITION NOTE - NSFOLLOWUPINSTRUCTIONS_ED_ALL_ED_FT
1. Rest. Stay hydrated.   2. Continue your current home medications.  3. Follow-up with your medical doctor in 2-3 days.  Bring your results with you for follow-up.  4. Return to the ER if you have any new or worsening symptoms, painful urination, vomiting, blood in urine, chest pain, difficulty breathing, fevers, chills, weakness, or any other concerns. 1. Follow up with your PMD within 48-72 hrs. Follow up with Urology this week. Phone: [phone]  2. Take Flomax 0.4mg daily, Motrin 600mg every 8 hrs for pain, Oxycodone 5mg at bedtime for breakthru pain- caution drowsiness (do not drive or operate heavy machinery).  Increase your fluid intake and continue to strain your urine.   3. Any worsening pain, fever, chills, difficulty urinating, return to ED

## 2020-12-15 NOTE — ED PROVIDER NOTE - CARE PLAN
Principal Discharge DX:	Acute back pain  Secondary Diagnosis:	Hydronephrosis, unspecified hydronephrosis type

## 2020-12-15 NOTE — ED PROVIDER NOTE - CHPI ED SYMPTOMS NEG
no blood in stool/no diarrhea/no dysuria/no fever/no hematuria/no vomiting/no burning urination/no chills

## 2020-12-15 NOTE — ED CDU PROVIDER INITIAL DAY NOTE - DETAILS
Kidney Stones  -IVF  -Pain control  -PO challenge  -Serial abdominal exam  -KENDELL Bunch, frequent reevaluations, vitals every 4 hours  -With worsening symptoms consider Urology consult Kidney Stones  -IVF  -Pain control  -PO challenge  -Serial abdominal exam  -KENDELL Bunch, frequent reevaluations, vitals every 4 hours  -Per Dr. Bunch, with worsening symptoms consider Urology consult

## 2020-12-15 NOTE — ED CDU PROVIDER DISPOSITION NOTE - CLINICAL COURSE
26 y/o F with PMH epilepsy presents to ED with 1 day of sharp L flank pain with radiation to lower abdomen. Pain associated with nausea, no vomiting. Denies fever, cough, chest pain, SOB, vomiting, sick contacts, recent travel, diarrhea, vaginal bleeding, vaginal discharge, dysuria, hematuria, increased urinary frequency, abdominal surgeries, calf pain, calf swelling.   ED course: US renal- "Mild left hydronephrosis. Nonvisualization of the left ureteral jet, raises the possibility of obstruction." Urine +small blood. Patient given IVF, IV Morphine, IV Toradol with relief of pain. Able to PO tolerate crackers and water in the ED. Plan for IVF, pain control, serial abdominal exams in CDU. 26 y/o F with PMH epilepsy presents to ED with 1 day of sharp L flank pain with radiation to lower abdomen. Pain associated with nausea, no vomiting.   	Denies fever, cough, chest pain, SOB, vomiting, sick contacts, recent travel, diarrhea, vaginal bleeding, vaginal discharge, dysuria, hematuria, increased urinary frequency, abdominal surgeries, calf pain, calf swelling.   ED course: US renal- "Mild left hydronephrosis. Nonvisualization of the left ureteral jet, raises the possibility of obstruction." Urine +small blood. Patient given IVF, IV Morphine, IV Toradol with relief of pain. Able to PO tolerate crackers and water in the ED. Per ED note, initial plan was for TVUS but was cancelled as ultrasound results supported suspicion for ureteral stone and ovarian torsion not suspected.  Plan for IVF, pain control, serial abdominal exams in CDU. 26 y/o F with PMH epilepsy presents to ED with 1 day of sharp L flank pain with radiation to lower abdomen. Pain associated with nausea, no vomiting.   	Denies fever, cough, chest pain, SOB, vomiting, sick contacts, recent travel, diarrhea, vaginal bleeding, vaginal discharge, dysuria, hematuria, increased urinary frequency, abdominal surgeries, calf pain, calf swelling.   ED course: US renal- "Mild left hydronephrosis. Nonvisualization of the left ureteral jet, raises the possibility of obstruction." Urine +small blood. Patient given IVF, IV Morphine, IV Toradol with relief of pain. Able to PO tolerate crackers and water in the ED. Per ED note, initial plan was for TVUS but was cancelled as ultrasound results supported suspicion for ureteral stone and ovarian torsion not suspected.  Plan for IVF, pain control, serial abdominal exams in CDU.  Improved pain. stable vitals. Tolerating po. sent rx for pain meds and flomax and urology followup . strict return precautions advised. Case discussed with Dr. Orlando.

## 2020-12-15 NOTE — ED CDU PROVIDER DISPOSITION NOTE - ATTENDING CONTRIBUTION TO CARE
CDU Attending Note -- Pt seen and examined at bedside.  Case discussed c CDU PA.  Pt comfortable, asymptomatic, and has good follow up.  Minimal LLQ abd pain, some nausea c PO.  Will give nsaids, tylenol, zofran, PO challenge and reassess.  Plan to d/c c pain Rx and close uro f/u assuming pt is comfortable c PO.  --BMM CDU Attending Note -- Pt seen and examined at bedside.  Case discussed c CDU PA.  Pt comfortable, asymptomatic, and has good follow up.  Minimal LLQ abd pain, some nausea c PO.  Will give nsaids, tylenol, zofran, PO challenge and reassess.  Plan to d/c c pain Rx and close uro f/u assuming pt is comfortable c PO.  See progress note from CDU PA.  --BMM

## 2020-12-15 NOTE — ED PROVIDER NOTE - CLINICAL SUMMARY MEDICAL DECISION MAKING FREE TEXT BOX
Attending MD Luna:  25F with epilepsy presenting with severe left low back pain radiating to LLQ and RLQ abdomen with nausea. No abdominal ttp on exam, no CVAT, pt denies vaginal bleeding or urinary symptoms. Ddx includes ureteral colic, ovarian cyst/torsion. Plan for US renal and TVUS, analgesia       *The above represents an initial assessment/impression. Please refer to progress notes for potential changes in patient clinical course*

## 2020-12-16 VITALS
OXYGEN SATURATION: 98 % | TEMPERATURE: 98 F | DIASTOLIC BLOOD PRESSURE: 67 MMHG | SYSTOLIC BLOOD PRESSURE: 100 MMHG | RESPIRATION RATE: 18 BRPM | HEART RATE: 94 BPM

## 2020-12-16 LAB
ANION GAP SERPL CALC-SCNC: 9 MMOL/L — SIGNIFICANT CHANGE UP (ref 5–17)
BASOPHILS # BLD AUTO: 0.01 K/UL — SIGNIFICANT CHANGE UP (ref 0–0.2)
BASOPHILS NFR BLD AUTO: 0.1 % — SIGNIFICANT CHANGE UP (ref 0–2)
BUN SERPL-MCNC: 12 MG/DL — SIGNIFICANT CHANGE UP (ref 7–23)
CALCIUM SERPL-MCNC: 8.9 MG/DL — SIGNIFICANT CHANGE UP (ref 8.4–10.5)
CHLORIDE SERPL-SCNC: 105 MMOL/L — SIGNIFICANT CHANGE UP (ref 96–108)
CO2 SERPL-SCNC: 21 MMOL/L — LOW (ref 22–31)
CREAT SERPL-MCNC: 1.51 MG/DL — HIGH (ref 0.5–1.3)
CULTURE RESULTS: SIGNIFICANT CHANGE UP
EOSINOPHIL # BLD AUTO: 0.04 K/UL — SIGNIFICANT CHANGE UP (ref 0–0.5)
EOSINOPHIL NFR BLD AUTO: 0.5 % — SIGNIFICANT CHANGE UP (ref 0–6)
GLUCOSE SERPL-MCNC: 106 MG/DL — HIGH (ref 70–99)
HCT VFR BLD CALC: 34.5 % — SIGNIFICANT CHANGE UP (ref 34.5–45)
HGB BLD-MCNC: 11.4 G/DL — LOW (ref 11.5–15.5)
IMM GRANULOCYTES NFR BLD AUTO: 0.5 % — SIGNIFICANT CHANGE UP (ref 0–1.5)
LYMPHOCYTES # BLD AUTO: 1.26 K/UL — SIGNIFICANT CHANGE UP (ref 1–3.3)
LYMPHOCYTES # BLD AUTO: 16.6 % — SIGNIFICANT CHANGE UP (ref 13–44)
MCHC RBC-ENTMCNC: 29.7 PG — SIGNIFICANT CHANGE UP (ref 27–34)
MCHC RBC-ENTMCNC: 33 GM/DL — SIGNIFICANT CHANGE UP (ref 32–36)
MCV RBC AUTO: 89.8 FL — SIGNIFICANT CHANGE UP (ref 80–100)
MONOCYTES # BLD AUTO: 0.95 K/UL — HIGH (ref 0–0.9)
MONOCYTES NFR BLD AUTO: 12.5 % — SIGNIFICANT CHANGE UP (ref 2–14)
NEUTROPHILS # BLD AUTO: 5.3 K/UL — SIGNIFICANT CHANGE UP (ref 1.8–7.4)
NEUTROPHILS NFR BLD AUTO: 69.8 % — SIGNIFICANT CHANGE UP (ref 43–77)
NRBC # BLD: 0 /100 WBCS — SIGNIFICANT CHANGE UP (ref 0–0)
PLATELET # BLD AUTO: 152 K/UL — SIGNIFICANT CHANGE UP (ref 150–400)
POTASSIUM SERPL-MCNC: 3.9 MMOL/L — SIGNIFICANT CHANGE UP (ref 3.5–5.3)
POTASSIUM SERPL-SCNC: 3.9 MMOL/L — SIGNIFICANT CHANGE UP (ref 3.5–5.3)
RBC # BLD: 3.84 M/UL — SIGNIFICANT CHANGE UP (ref 3.8–5.2)
RBC # FLD: 13.4 % — SIGNIFICANT CHANGE UP (ref 10.3–14.5)
SARS-COV-2 RNA SPEC QL NAA+PROBE: SIGNIFICANT CHANGE UP
SODIUM SERPL-SCNC: 135 MMOL/L — SIGNIFICANT CHANGE UP (ref 135–145)
SPECIMEN SOURCE: SIGNIFICANT CHANGE UP
WBC # BLD: 7.6 K/UL — SIGNIFICANT CHANGE UP (ref 3.8–10.5)
WBC # FLD AUTO: 7.6 K/UL — SIGNIFICANT CHANGE UP (ref 3.8–10.5)

## 2020-12-16 PROCEDURE — 83690 ASSAY OF LIPASE: CPT

## 2020-12-16 PROCEDURE — 96376 TX/PRO/DX INJ SAME DRUG ADON: CPT | Mod: XU

## 2020-12-16 PROCEDURE — 80048 BASIC METABOLIC PNL TOTAL CA: CPT

## 2020-12-16 PROCEDURE — 96375 TX/PRO/DX INJ NEW DRUG ADDON: CPT

## 2020-12-16 PROCEDURE — 81001 URINALYSIS AUTO W/SCOPE: CPT

## 2020-12-16 PROCEDURE — 84702 CHORIONIC GONADOTROPIN TEST: CPT

## 2020-12-16 PROCEDURE — 99284 EMERGENCY DEPT VISIT MOD MDM: CPT | Mod: 25

## 2020-12-16 PROCEDURE — 87086 URINE CULTURE/COLONY COUNT: CPT

## 2020-12-16 PROCEDURE — G0378: CPT

## 2020-12-16 PROCEDURE — 85610 PROTHROMBIN TIME: CPT

## 2020-12-16 PROCEDURE — 96374 THER/PROPH/DIAG INJ IV PUSH: CPT

## 2020-12-16 PROCEDURE — 85730 THROMBOPLASTIN TIME PARTIAL: CPT

## 2020-12-16 PROCEDURE — U0003: CPT

## 2020-12-16 PROCEDURE — 80053 COMPREHEN METABOLIC PANEL: CPT

## 2020-12-16 PROCEDURE — 99217: CPT

## 2020-12-16 PROCEDURE — 76775 US EXAM ABDO BACK WALL LIM: CPT

## 2020-12-16 PROCEDURE — 85025 COMPLETE CBC W/AUTO DIFF WBC: CPT

## 2020-12-16 RX ORDER — ACETAMINOPHEN 500 MG
650 TABLET ORAL EVERY 6 HOURS
Refills: 0 | Status: DISCONTINUED | OUTPATIENT
Start: 2020-12-16 | End: 2020-12-19

## 2020-12-16 RX ORDER — KETOROLAC TROMETHAMINE 30 MG/ML
15 SYRINGE (ML) INJECTION ONCE
Refills: 0 | Status: DISCONTINUED | OUTPATIENT
Start: 2020-12-16 | End: 2020-12-16

## 2020-12-16 RX ORDER — IBUPROFEN 200 MG
400 TABLET ORAL ONCE
Refills: 0 | Status: COMPLETED | OUTPATIENT
Start: 2020-12-16 | End: 2020-12-16

## 2020-12-16 RX ORDER — KETOROLAC TROMETHAMINE 30 MG/ML
15 SYRINGE (ML) INJECTION EVERY 8 HOURS
Refills: 0 | Status: DISCONTINUED | OUTPATIENT
Start: 2020-12-16 | End: 2020-12-16

## 2020-12-16 RX ORDER — ONDANSETRON 8 MG/1
4 TABLET, FILM COATED ORAL ONCE
Refills: 0 | Status: COMPLETED | OUTPATIENT
Start: 2020-12-16 | End: 2020-12-16

## 2020-12-16 RX ORDER — ACETAMINOPHEN 500 MG
650 TABLET ORAL ONCE
Refills: 0 | Status: DISCONTINUED | OUTPATIENT
Start: 2020-12-16 | End: 2020-12-16

## 2020-12-16 RX ORDER — TAMSULOSIN HYDROCHLORIDE 0.4 MG/1
1 CAPSULE ORAL
Qty: 10 | Refills: 0
Start: 2020-12-16 | End: 2020-12-25

## 2020-12-16 RX ORDER — OXYCODONE HYDROCHLORIDE 5 MG/1
1 TABLET ORAL
Qty: 5 | Refills: 0
Start: 2020-12-16

## 2020-12-16 RX ADMIN — Medication 15 MILLIGRAM(S): at 03:02

## 2020-12-16 RX ADMIN — Medication 650 MILLIGRAM(S): at 09:21

## 2020-12-16 RX ADMIN — SODIUM CHLORIDE 130 MILLILITER(S): 9 INJECTION INTRAMUSCULAR; INTRAVENOUS; SUBCUTANEOUS at 05:30

## 2020-12-16 RX ADMIN — Medication 15 MILLIGRAM(S): at 02:34

## 2020-12-16 RX ADMIN — Medication 400 MILLIGRAM(S): at 09:22

## 2020-12-16 RX ADMIN — ONDANSETRON 4 MILLIGRAM(S): 8 TABLET, FILM COATED ORAL at 09:24

## 2020-12-16 NOTE — ED CDU PROVIDER SUBSEQUENT DAY NOTE - PROGRESS NOTE DETAILS
Patient with 9/10 L flank pain. +L CVAT. Abdomen soft, nontender. Asking for pain medication. Will give Toradol. - Shital Richardson PA-C Patient seen at bedside. VSS. Patient resting comfortably, no complaints.  Will reevaluate. - Shital Richardson PA-C Pt resting comfortably. NAD. No complaints. VSS.

## 2020-12-16 NOTE — ED ADULT NURSE REASSESSMENT NOTE - NS ED NURSE REASSESS COMMENT FT1
Pt transported to US via stretcher. On RA, ticket to ride completed. Reporting pain relief post morphine administration.
Report taken from Lena HORTON. States patient had a good night with no complaints. Will continue to monitor.
Pt received from CLIFFORD Jimenez. Pt oriented to CDU & plan of care was discussed. Pt endorses 7/10 back pain improving post morphine admin. Pt also endorsing nausea. Medicated as per MAR. Safety & comfort measures maintained. Call bell in reach. Will continue to monitor.

## 2020-12-16 NOTE — ED CDU PROVIDER SUBSEQUENT DAY NOTE - HISTORY
No interval changes since initial CDU provider note. Pt feels well without complaint. Denies pain. Abdomen soft, nontender. NO CVAT. NAD VSS. Plan to continue serial abdominal exams, pain control, and IVF in the setting of renal colic. - HOLDEN Richardson

## 2020-12-18 ENCOUNTER — APPOINTMENT (OUTPATIENT)
Dept: UROLOGY | Facility: CLINIC | Age: 25
End: 2020-12-18
Payer: COMMERCIAL

## 2020-12-18 VITALS
HEIGHT: 65 IN | BODY MASS INDEX: 23.32 KG/M2 | TEMPERATURE: 98 F | DIASTOLIC BLOOD PRESSURE: 74 MMHG | WEIGHT: 140 LBS | RESPIRATION RATE: 14 BRPM | HEART RATE: 103 BPM | SYSTOLIC BLOOD PRESSURE: 123 MMHG

## 2020-12-18 DIAGNOSIS — Z00.00 ENCOUNTER FOR GENERAL ADULT MEDICAL EXAMINATION W/OUT ABNORMAL FINDINGS: ICD-10-CM

## 2020-12-18 DIAGNOSIS — Z15.89 GENETIC SUSCEPTIBILITY TO OTHER DISEASE: ICD-10-CM

## 2020-12-18 PROCEDURE — 99204 OFFICE O/P NEW MOD 45 MIN: CPT

## 2020-12-18 PROCEDURE — 99072 ADDL SUPL MATRL&STAF TM PHE: CPT

## 2020-12-18 NOTE — REVIEW OF SYSTEMS
[Chest Pain] : chest pain [see HPI] : see HPI [Negative] : Heme/Lymph [Abdominal Pain] : abdominal pain

## 2020-12-18 NOTE — HISTORY OF PRESENT ILLNESS
[FreeTextEntry1] : Very pleasant 25 year old woman with history of epilepsy presents for evaluation of left flank pain, hydronephrosis on recent ultrasound.  Pt reports a history of left flank pain for the past year which worsened this past Sunday.  Pt noted lower abdominal severe pain which she describes as she reports that the pain radiated to left flank and became increasingly severe at which time she presented to ED. US in emergency department demonstrated moderate left sided hydronephrosis without visualization of ureteral jets.  Pt reports associated nausea, however she denies fevers, chills, dysuria, hematuria.   Grandfather had kidney stones. nonsmoker\par \par UCx: negative\par SCr: 1.5 12/15/20\par

## 2020-12-18 NOTE — ASSESSMENT
[FreeTextEntry1] : Very pleasant 25 year old female presenting with left flank pain in setting of left mild hydro on US.   \par -Given significant pain and history of prolonged pain over the last year we will do a CT scan  to further elucidate etiology of her pain.  \par -Discussed my concern for ureteral stone at this time\par -We discussed options for management moving forward, including additional conservative therapy vs. imaging.  Pt would like to proceed with CT given significant pain as well as prolonged course of pain\par -Renal ultrasound images reviewed with patient\par -previous labs reviewed with patient\par -BMP today\par -CT stone hunt\par -f/u next Wednesday\par -strain urine; strainer given to the patient

## 2020-12-21 ENCOUNTER — RESULT REVIEW (OUTPATIENT)
Age: 25
End: 2020-12-21

## 2020-12-21 ENCOUNTER — OUTPATIENT (OUTPATIENT)
Dept: OUTPATIENT SERVICES | Facility: HOSPITAL | Age: 25
LOS: 1 days | End: 2020-12-21
Payer: COMMERCIAL

## 2020-12-21 ENCOUNTER — APPOINTMENT (OUTPATIENT)
Dept: CT IMAGING | Facility: CLINIC | Age: 25
End: 2020-12-21
Payer: COMMERCIAL

## 2020-12-21 DIAGNOSIS — R10.9 UNSPECIFIED ABDOMINAL PAIN: ICD-10-CM

## 2020-12-21 LAB
ANION GAP SERPL CALC-SCNC: 11 MMOL/L
APPEARANCE: ABNORMAL
BACTERIA UR CULT: NORMAL
BACTERIA: NEGATIVE
BILIRUBIN URINE: NEGATIVE
BLOOD URINE: NORMAL
BUN SERPL-MCNC: 11 MG/DL
CALCIUM SERPL-MCNC: 9.6 MG/DL
CHLORIDE SERPL-SCNC: 108 MMOL/L
CO2 SERPL-SCNC: 22 MMOL/L
COLOR: YELLOW
CREAT SERPL-MCNC: 1.39 MG/DL
GLUCOSE QUALITATIVE U: NEGATIVE
GLUCOSE SERPL-MCNC: 72 MG/DL
HCG UR QL: NEGATIVE
HYALINE CASTS: 0 /LPF
KETONES URINE: NEGATIVE
LEUKOCYTE ESTERASE URINE: ABNORMAL
MICROSCOPIC-UA: NORMAL
NITRITE URINE: NEGATIVE
PH URINE: 6.5
POTASSIUM SERPL-SCNC: 4.4 MMOL/L
PROTEIN URINE: NORMAL
RED BLOOD CELLS URINE: 1 /HPF
SODIUM SERPL-SCNC: 141 MMOL/L
SPECIFIC GRAVITY URINE: 1.02
SQUAMOUS EPITHELIAL CELLS: 8 /HPF
URINE COMMENTS: NORMAL
UROBILINOGEN URINE: NORMAL
WHITE BLOOD CELLS URINE: 14 /HPF

## 2020-12-21 PROCEDURE — 74176 CT ABD & PELVIS W/O CONTRAST: CPT

## 2020-12-21 PROCEDURE — 74176 CT ABD & PELVIS W/O CONTRAST: CPT | Mod: 26

## 2020-12-23 ENCOUNTER — APPOINTMENT (OUTPATIENT)
Dept: UROLOGY | Facility: CLINIC | Age: 25
End: 2020-12-23
Payer: COMMERCIAL

## 2020-12-23 PROCEDURE — 99072 ADDL SUPL MATRL&STAF TM PHE: CPT

## 2020-12-23 PROCEDURE — 99214 OFFICE O/P EST MOD 30 MIN: CPT

## 2020-12-23 NOTE — ASSESSMENT
[FreeTextEntry1] : Very pleasant 25 year old female presents for follow up of left flank pain in setting of left mild hydro on US now confirmed 9mm left UPJ stone.\par -CT images reviewed\par -prior labs reviewed\par -repeat UCx today\par -I discussed the different treatment modalities for nephrolithiasis with the patient, including medical management, spontaneous stone passage, percutaneous stone extraction, extracorporeal shock wave lithotripsy, and ureteroscopy with laser lithotripsy and stone extraction. Given the size, location, and density of the stone, the patient opted to proceed with ureteroscopy.  The risks, benefits, and alternatives to ureteroscopy were discussed with the patient, including but not limited to pain, infection, bleeding, bladder injury, ureteral injury, renal injury, and treatment failure.  I also discussed the possible need for a temporary ureteral stent.  I discussed the possible side effects of a temporary ureteral stent, including flank pain, hematuria, and bladder spasms.  The patient understands that a stent is a temporary implant that must be removed in the future.  The patient wishes to proceed and we will schedule the surgery for the near future.\par \par \par \par \par

## 2020-12-23 NOTE — HISTORY OF PRESENT ILLNESS
[FreeTextEntry1] : Very pleasant 25 year old woman with history of epilepsy presents for follow up of left flank pain, left hydronephrosis on recent ultrasound. Pt reports a history of left flank pain for the past year which worsened recently. Pt noted lower abdominal severe pain which she reports radiated to left flank and became increasingly severe at which time she presented to ED. US in emergency department demonstrated moderate left sided hydronephrosis without visualization of left ureteral jet. Pt reports associated nausea, however she denies fevers, chills, dysuria, hematuria.\par \par Interval events:  \par Pt reports having improving pain over since last visit, denies fevers chills, n/v/d/, CT shows 9mm left UPJ stone.  [id number] HU\par \par UCx: contaminated\par SCr 12/15/20: 1.5\par SCr 12/21/20: 1.39\par

## 2020-12-28 LAB — BACTERIA UR CULT: ABNORMAL

## 2021-01-04 ENCOUNTER — OUTPATIENT (OUTPATIENT)
Dept: OUTPATIENT SERVICES | Facility: HOSPITAL | Age: 26
LOS: 1 days | End: 2021-01-04
Payer: COMMERCIAL

## 2021-01-04 VITALS
DIASTOLIC BLOOD PRESSURE: 70 MMHG | TEMPERATURE: 99 F | HEART RATE: 87 BPM | HEIGHT: 64 IN | OXYGEN SATURATION: 100 % | RESPIRATION RATE: 18 BRPM | SYSTOLIC BLOOD PRESSURE: 114 MMHG | WEIGHT: 134.92 LBS

## 2021-01-04 DIAGNOSIS — N20.0 CALCULUS OF KIDNEY: ICD-10-CM

## 2021-01-04 DIAGNOSIS — Z01.818 ENCOUNTER FOR OTHER PREPROCEDURAL EXAMINATION: ICD-10-CM

## 2021-01-04 DIAGNOSIS — Z90.89 ACQUIRED ABSENCE OF OTHER ORGANS: Chronic | ICD-10-CM

## 2021-01-04 DIAGNOSIS — R56.9 UNSPECIFIED CONVULSIONS: ICD-10-CM

## 2021-01-04 LAB
ALBUMIN SERPL ELPH-MCNC: 4.3 G/DL — SIGNIFICANT CHANGE UP (ref 3.5–5)
ALP SERPL-CCNC: 62 U/L — SIGNIFICANT CHANGE UP (ref 40–120)
ALT FLD-CCNC: 17 U/L DA — SIGNIFICANT CHANGE UP (ref 10–60)
ANION GAP SERPL CALC-SCNC: 8 MMOL/L — SIGNIFICANT CHANGE UP (ref 5–17)
APPEARANCE UR: CLEAR — SIGNIFICANT CHANGE UP
APTT BLD: 29.5 SEC — SIGNIFICANT CHANGE UP (ref 27.5–35.5)
AST SERPL-CCNC: 6 U/L — LOW (ref 10–40)
BACTERIA # UR AUTO: ABNORMAL /HPF
BILIRUB SERPL-MCNC: 0.2 MG/DL — SIGNIFICANT CHANGE UP (ref 0.2–1.2)
BILIRUB UR-MCNC: NEGATIVE — SIGNIFICANT CHANGE UP
BUN SERPL-MCNC: 11 MG/DL — SIGNIFICANT CHANGE UP (ref 7–18)
CALCIUM SERPL-MCNC: 9.4 MG/DL — SIGNIFICANT CHANGE UP (ref 8.4–10.5)
CHLORIDE SERPL-SCNC: 110 MMOL/L — HIGH (ref 96–108)
CO2 SERPL-SCNC: 23 MMOL/L — SIGNIFICANT CHANGE UP (ref 22–31)
COLOR SPEC: YELLOW — SIGNIFICANT CHANGE UP
CREAT SERPL-MCNC: 1.06 MG/DL — SIGNIFICANT CHANGE UP (ref 0.5–1.3)
DIFF PNL FLD: ABNORMAL
EPI CELLS # UR: SIGNIFICANT CHANGE UP /HPF
GLUCOSE SERPL-MCNC: 87 MG/DL — SIGNIFICANT CHANGE UP (ref 70–99)
GLUCOSE UR QL: NEGATIVE — SIGNIFICANT CHANGE UP
HCG SERPL-ACNC: <1 MIU/ML — SIGNIFICANT CHANGE UP
HCT VFR BLD CALC: 39.5 % — SIGNIFICANT CHANGE UP (ref 34.5–45)
HGB BLD-MCNC: 12.8 G/DL — SIGNIFICANT CHANGE UP (ref 11.5–15.5)
INR BLD: 1.17 RATIO — HIGH (ref 0.88–1.16)
KETONES UR-MCNC: NEGATIVE — SIGNIFICANT CHANGE UP
LEUKOCYTE ESTERASE UR-ACNC: ABNORMAL
MCHC RBC-ENTMCNC: 28.9 PG — SIGNIFICANT CHANGE UP (ref 27–34)
MCHC RBC-ENTMCNC: 32.4 GM/DL — SIGNIFICANT CHANGE UP (ref 32–36)
MCV RBC AUTO: 89.2 FL — SIGNIFICANT CHANGE UP (ref 80–100)
NITRITE UR-MCNC: NEGATIVE — SIGNIFICANT CHANGE UP
NRBC # BLD: 0 /100 WBCS — SIGNIFICANT CHANGE UP (ref 0–0)
PH UR: 6 — SIGNIFICANT CHANGE UP (ref 5–8)
PLATELET # BLD AUTO: 257 K/UL — SIGNIFICANT CHANGE UP (ref 150–400)
POTASSIUM SERPL-MCNC: 3.7 MMOL/L — SIGNIFICANT CHANGE UP (ref 3.5–5.3)
POTASSIUM SERPL-SCNC: 3.7 MMOL/L — SIGNIFICANT CHANGE UP (ref 3.5–5.3)
PROT SERPL-MCNC: 8.2 G/DL — SIGNIFICANT CHANGE UP (ref 6–8.3)
PROT UR-MCNC: NEGATIVE — SIGNIFICANT CHANGE UP
PROTHROM AB SERPL-ACNC: 13.8 SEC — HIGH (ref 10.6–13.6)
RBC # BLD: 4.43 M/UL — SIGNIFICANT CHANGE UP (ref 3.8–5.2)
RBC # FLD: 13 % — SIGNIFICANT CHANGE UP (ref 10.3–14.5)
RBC CASTS # UR COMP ASSIST: ABNORMAL /HPF (ref 0–2)
SODIUM SERPL-SCNC: 141 MMOL/L — SIGNIFICANT CHANGE UP (ref 135–145)
SP GR SPEC: 1.01 — SIGNIFICANT CHANGE UP (ref 1.01–1.02)
UROBILINOGEN FLD QL: NEGATIVE — SIGNIFICANT CHANGE UP
WBC # BLD: 5.34 K/UL — SIGNIFICANT CHANGE UP (ref 3.8–10.5)
WBC # FLD AUTO: 5.34 K/UL — SIGNIFICANT CHANGE UP (ref 3.8–10.5)
WBC UR QL: SIGNIFICANT CHANGE UP /HPF (ref 0–5)

## 2021-01-04 PROCEDURE — 85610 PROTHROMBIN TIME: CPT

## 2021-01-04 PROCEDURE — 87086 URINE CULTURE/COLONY COUNT: CPT

## 2021-01-04 PROCEDURE — 84702 CHORIONIC GONADOTROPIN TEST: CPT

## 2021-01-04 PROCEDURE — 85027 COMPLETE CBC AUTOMATED: CPT

## 2021-01-04 PROCEDURE — 80053 COMPREHEN METABOLIC PANEL: CPT

## 2021-01-04 PROCEDURE — 85730 THROMBOPLASTIN TIME PARTIAL: CPT

## 2021-01-04 PROCEDURE — G0463: CPT

## 2021-01-04 PROCEDURE — 80175 DRUG SCREEN QUAN LAMOTRIGINE: CPT

## 2021-01-04 PROCEDURE — 93005 ELECTROCARDIOGRAM TRACING: CPT

## 2021-01-04 PROCEDURE — 81001 URINALYSIS AUTO W/SCOPE: CPT

## 2021-01-04 PROCEDURE — 36415 COLL VENOUS BLD VENIPUNCTURE: CPT

## 2021-01-04 RX ORDER — LAMOTRIGINE 25 MG/1
0 TABLET, ORALLY DISINTEGRATING ORAL
Qty: 0 | Refills: 0 | DISCHARGE

## 2021-01-04 NOTE — H&P PST ADULT - NEUROLOGICAL COMMENTS
h/o tonic-clonic seizures-last episode in 2016 h/o tonic-clonic seizures-last episode in 2016; started at the age of 9

## 2021-01-04 NOTE — H&P PST ADULT - NSICDXPROBLEM_GEN_ALL_CORE_FT
PROBLEM DIAGNOSES  Problem: Calculus of left kidney  Assessment and Plan: Cystoscopy, left ureteroscopy, laser lithotripsy, stone extraction, stent placement, retrograde pyelogram on 01/14/2021. Preoperative instructions discussed with pt and given to pt. Instructed pt that she will need someone to escort her home after surgery, that no one will be allowed to come to hospital with her, to notify security when she arrives in the lobby of the hospital that she is here for surgery, not to eat or drink anything after midnight the night before the surgery, to avoid NSAIDS such as Ibuprofen, motrin, aleve, advil, naproxen before surgery, to take Tylenol if needed for pain, to report if she has been exposed to any one with any contagious diseases including Covid-19 or if she is exhibiting any symptoms of COVID-19, to keep appointment for COVID-19  test 3 days before surgery. Instructed about use of Chlorhexidine 4% soap before surgery. Verbalized understanding of instructions given.     Problem: Seizures  Assessment and Plan: Left message with  for neurologist regarding neurology clearance. Instructed to continue medications and take Lamictal with sips of water on day of surgery. Follow-up with neurology for management. Safety measures perioperatively.

## 2021-01-04 NOTE — H&P PST ADULT - GASTROINTESTINAL DETAILS
normal/soft/nontender/no distention/no masses palpable/bowel sounds normal/no bruit/no rebound tenderness/no guarding

## 2021-01-04 NOTE — H&P PST ADULT - NEGATIVE RESPIRATORY AND THORAX SYMPTOMS
Subjective:       Patient ID: Angelica Chan is a 62 y.o. female.    Chief Complaint: Follow-up    Follow up:   Lupus and sjogren's syndrome,  Doing fair, Patient complains of arthralgias and myalgias for which has been present for a few years. Pain is located in multiple joints, both shoulder(s), both elbow(s), both wrist(s), both MCP(s): 1st, 2nd, 3rd, 4th and 5th, both PIP(s): 1st, 2nd, 3rd, 4th and 5th, both DIP(s): 1st and 2nd, both hip(s), both knee(s) and both MTP(s): 1st, 2nd, 3rd, 4th and 5th, is described as aching, pulsating, shooting and throbbing, and is constant, moderate .  Associated symptoms include: crepitation, decreased range of motion, edema, effusion, tenderness and warmth. Her B/p elevation       Review of Systems   Constitutional: Positive for activity change and chills. Negative for appetite change, diaphoresis and unexpected weight change.   HENT: Negative for congestion, dental problem, ear discharge, ear pain, facial swelling, mouth sores, nosebleeds, postnasal drip, rhinorrhea, sinus pressure, sneezing, sore throat, tinnitus and voice change.    Eyes: Negative for photophobia, pain, discharge, redness and itching.   Respiratory: Negative for apnea, cough, chest tightness, shortness of breath and wheezing.    Cardiovascular: Positive for leg swelling. Negative for chest pain and palpitations.   Gastrointestinal: Positive for abdominal pain. Negative for abdominal distention, constipation, diarrhea, nausea and vomiting.   Endocrine: Negative for cold intolerance, heat intolerance, polydipsia and polyuria.   Genitourinary: Negative for decreased urine volume, difficulty urinating, flank pain, frequency, hematuria and urgency.   Musculoskeletal: Positive for arthralgias, back pain, joint swelling, neck pain and neck stiffness. Negative for gait problem.   Skin: Negative for pallor, rash and wound.        Drainage of incision   Allergic/Immunologic: Negative for immunocompromised state.  "  Neurological: Positive for weakness. Negative for dizziness, tremors and numbness.   Hematological: Negative for adenopathy. Does not bruise/bleed easily.   Psychiatric/Behavioral: Negative for sleep disturbance. The patient is not nervous/anxious.          Objective:     BP (!) 140/90 (BP Location: Right arm, Patient Position: Sitting, BP Method: Medium (Manual))   Pulse 71   Ht 5' 5" (1.651 m)   Wt 100 kg (220 lb 7.4 oz)   BMI 36.69 kg/m²      Physical Exam   Vitals reviewed.  Constitutional: She is oriented to person, place, and time. No distress.   HENT:   Head: Normocephalic and atraumatic.   Mouth thrush   Eyes: EOM are normal. Pupils are equal, round, and reactive to light. Right eye exhibits no discharge. Left eye exhibits no discharge.   Neck: Neck supple. No thyromegaly present.   Cardiovascular: Normal rate, regular rhythm and normal heart sounds.  Exam reveals no gallop and no friction rub.    No murmur heard.  Pulmonary/Chest: Breath sounds normal. She has no wheezes. She has no rales. She exhibits no tenderness.   Abdominal: There is no tenderness. There is no rebound and no guarding.       Right Side Rheumatological Exam     Examination finds the elbow normal.    The patient is tender to palpation of the shoulder, wrist, knee, 1st PIP, 1st MCP, 2nd PIP, 2nd MCP, 3rd PIP, 3rd MCP, 4th PIP, 4th MCP, 5th PIP and 5th MCP    She has swelling of the 1st PIP, 1st MCP, 2nd PIP, 2nd MCP, 3rd PIP, 3rd MCP, 4th PIP, 4th MCP, 5th PIP and 5th MCP    Shoulder Exam   Tenderness Location: no tenderness    Range of Motion   Active abduction: abnormal   Adduction: abnormal  Sensation: normal    Knee Exam   Patellofemoral Crepitus: positive  Effusion: positive  Sensation: normal    Hip Exam   Tenderness Location: posterior  Sensation: normal    Elbow/Wrist Exam   Tenderness Location: no tenderness  Sensation: normal    Left Side Rheumatological Exam     The patient is tender to palpation of the shoulder, elbow, " wrist, knee, 1st PIP, 1st MCP, 2nd PIP, 2nd MCP, 3rd PIP, 3rd MCP, 4th PIP, 4th MCP, 5th PIP and 5th MCP.    She has swelling of the 1st PIP, 1st MCP, 2nd PIP, 2nd MCP, 3rd PIP, 3rd MCP, 4th PIP, 4th MCP, 5th PIP and 5th MCP    Shoulder Exam   Tenderness Location: no tenderness    Range of Motion   Active abduction: abnormal   Sensation: normal    Knee Exam     Patellofemoral Crepitus: positive  Effusion: positive  Sensation: normal    Hip Exam   Tenderness Location: posterior  Sensation: normal    Elbow/Wrist Exam   Sensation: normal      Back/Neck Exam   General Inspection   Gait: normal         Lymphadenopathy:     She has no cervical adenopathy.   Neurological: She is alert and oriented to person, place, and time. Gait normal.   Skin: Skin is dry. No rash noted. No erythema. No pallor.     Incision and drainage   Psychiatric: Mood and affect normal.   Musculoskeletal: She exhibits tenderness and deformity. She exhibits no edema.      jhonny 2.0 normal less  crp 4.0, cbc, cmp  Assessment:       .  Encounter Diagnoses   Name Primary?    Systemic lupus erythematosus, unspecified SLE type, unspecified organ involvement status Yes    Fibromyalgia     Sjogren's syndrome, with unspecified organ involvement     Lumbar and sacral osteoarthritis     Sinus congestion         Plan:     Systemic lupus erythematosus, unspecified SLE type, unspecified organ involvement status  -     ketorolac (TORADOL) 60 mg/2 mL Soln; Inject 2 mLs (60 mg total) into the muscle every 6 weeks.  Dispense: 2 mL; Refill: 8  -     methylPREDNISolone acetate (DEPO-MEDROL) 80 mg/mL injection; Inject 2 mLs (160 mg total) into the muscle every 30 days.  Dispense: 6 mL; Refill: 3  -     potassium chloride SA (K-DUR,KLOR-CON) 10 MEQ tablet; Take 1 tablet (10 mEq total) by mouth once daily.  Dispense: 30 tablet; Refill: 3  -     CBC auto differential; Future; Expected date: 12/04/2018  -     Comprehensive metabolic panel; Future; Expected date:  12/04/2018  -     C-reactive protein; Future; Expected date: 12/04/2018  -     Sedimentation rate, automated; Future; Expected date: 12/04/2018  -     MARTI; Future; Expected date: 12/04/2018  -     Sjogrens syndrome-A extractable nuclear antibody; Future; Expected date: 12/04/2018  -     Sjogrens syndrome-B extractable nuclear antibody; Future; Expected date: 12/04/2018  -     TSH; Future; Expected date: 12/04/2018  -     T3, free; Future; Expected date: 12/04/2018  -     Basic metabolic panel; Future; Expected date: 12/04/2018  -     acetaminophen-codeine 300-30mg (TYLENOL #3) 300-30 mg Tab; Take 1 tablet by mouth 3 (three) times daily as needed.  Dispense: 90 tablet; Refill: 3    Fibromyalgia  -     ketorolac (TORADOL) 60 mg/2 mL Soln; Inject 2 mLs (60 mg total) into the muscle every 6 weeks.  Dispense: 2 mL; Refill: 8  -     methylPREDNISolone acetate (DEPO-MEDROL) 80 mg/mL injection; Inject 2 mLs (160 mg total) into the muscle every 30 days.  Dispense: 6 mL; Refill: 3  -     potassium chloride SA (K-DUR,KLOR-CON) 10 MEQ tablet; Take 1 tablet (10 mEq total) by mouth once daily.  Dispense: 30 tablet; Refill: 3  -     CBC auto differential; Future; Expected date: 12/04/2018  -     Comprehensive metabolic panel; Future; Expected date: 12/04/2018  -     C-reactive protein; Future; Expected date: 12/04/2018  -     Sedimentation rate, automated; Future; Expected date: 12/04/2018  -     MARTI; Future; Expected date: 12/04/2018  -     Sjogrens syndrome-A extractable nuclear antibody; Future; Expected date: 12/04/2018  -     Sjogrens syndrome-B extractable nuclear antibody; Future; Expected date: 12/04/2018  -     TSH; Future; Expected date: 12/04/2018  -     T3, free; Future; Expected date: 12/04/2018  -     Basic metabolic panel; Future; Expected date: 12/04/2018  -     acetaminophen-codeine 300-30mg (TYLENOL #3) 300-30 mg Tab; Take 1 tablet by mouth 3 (three) times daily as needed.  Dispense: 90 tablet; Refill:  3    Sjogren's syndrome, with unspecified organ involvement  -     ketorolac (TORADOL) 60 mg/2 mL Soln; Inject 2 mLs (60 mg total) into the muscle every 6 weeks.  Dispense: 2 mL; Refill: 8  -     methylPREDNISolone acetate (DEPO-MEDROL) 80 mg/mL injection; Inject 2 mLs (160 mg total) into the muscle every 30 days.  Dispense: 6 mL; Refill: 3  -     potassium chloride SA (K-DUR,KLOR-CON) 10 MEQ tablet; Take 1 tablet (10 mEq total) by mouth once daily.  Dispense: 30 tablet; Refill: 3  -     CBC auto differential; Future; Expected date: 12/04/2018  -     Comprehensive metabolic panel; Future; Expected date: 12/04/2018  -     C-reactive protein; Future; Expected date: 12/04/2018  -     Sedimentation rate, automated; Future; Expected date: 12/04/2018  -     MARTI; Future; Expected date: 12/04/2018  -     Sjogrens syndrome-A extractable nuclear antibody; Future; Expected date: 12/04/2018  -     Sjogrens syndrome-B extractable nuclear antibody; Future; Expected date: 12/04/2018  -     TSH; Future; Expected date: 12/04/2018  -     T3, free; Future; Expected date: 12/04/2018  -     Basic metabolic panel; Future; Expected date: 12/04/2018  -     acetaminophen-codeine 300-30mg (TYLENOL #3) 300-30 mg Tab; Take 1 tablet by mouth 3 (three) times daily as needed.  Dispense: 90 tablet; Refill: 3    Lumbar and sacral osteoarthritis  -     ketorolac (TORADOL) 60 mg/2 mL Soln; Inject 2 mLs (60 mg total) into the muscle every 6 weeks.  Dispense: 2 mL; Refill: 8  -     methylPREDNISolone acetate (DEPO-MEDROL) 80 mg/mL injection; Inject 2 mLs (160 mg total) into the muscle every 30 days.  Dispense: 6 mL; Refill: 3  -     potassium chloride SA (K-DUR,KLOR-CON) 10 MEQ tablet; Take 1 tablet (10 mEq total) by mouth once daily.  Dispense: 30 tablet; Refill: 3  -     CBC auto differential; Future; Expected date: 12/04/2018  -     Comprehensive metabolic panel; Future; Expected date: 12/04/2018  -     C-reactive protein; Future; Expected date:  12/04/2018  -     Sedimentation rate, automated; Future; Expected date: 12/04/2018  -     MARTI; Future; Expected date: 12/04/2018  -     Sjogrens syndrome-A extractable nuclear antibody; Future; Expected date: 12/04/2018  -     Sjogrens syndrome-B extractable nuclear antibody; Future; Expected date: 12/04/2018  -     TSH; Future; Expected date: 12/04/2018  -     T3, free; Future; Expected date: 12/04/2018  -     Basic metabolic panel; Future; Expected date: 12/04/2018  -     acetaminophen-codeine 300-30mg (TYLENOL #3) 300-30 mg Tab; Take 1 tablet by mouth 3 (three) times daily as needed.  Dispense: 90 tablet; Refill: 3    Sinus congestion  -     ketorolac (TORADOL) 60 mg/2 mL Soln; Inject 2 mLs (60 mg total) into the muscle every 6 weeks.  Dispense: 2 mL; Refill: 8  -     methylPREDNISolone acetate (DEPO-MEDROL) 80 mg/mL injection; Inject 2 mLs (160 mg total) into the muscle every 30 days.  Dispense: 6 mL; Refill: 3  -     potassium chloride SA (K-DUR,KLOR-CON) 10 MEQ tablet; Take 1 tablet (10 mEq total) by mouth once daily.  Dispense: 30 tablet; Refill: 3         no wheezing/no dyspnea/no cough/no hemoptysis/no pleuritic chest pain

## 2021-01-04 NOTE — H&P PST ADULT - HISTORY OF PRESENT ILLNESS
25 yr old female with PMH of seizures presents with c/o left flank pain due to renal stone that measures 9mm. Pt denies any dysuria, hematuria. Pt for Cystoscopy, left ureteroscopy, laser lithotripsy, stone extraction, stent placement, retrograde pyelogram on 01/04/2021. 25 yr old female with PMH of seizures presents with c/o left flank pain due to renal stone that measures 9mm. Pt denies any dysuria, hematuria. Pt for Cystoscopy, left ureteroscopy, laser lithotripsy, stone extraction, stent placement, retrograde pyelogram on 01/14/2021.

## 2021-01-04 NOTE — H&P PST ADULT - NEGATIVE NEUROLOGICAL SYMPTOMS
no weakness/no paresthesias/no syncope/no tremors/no vertigo/no loss of sensation/no difficulty walking/no headache/no loss of consciousness/no hemiparesis

## 2021-01-04 NOTE — H&P PST ADULT - NSICDXPASTMEDICALHX_GEN_ALL_CORE_FT
PAST MEDICAL HISTORY:  Epilepsy with status epilepticus, not intractable      PAST MEDICAL HISTORY:  Calculus of left kidney     Epilepsy with status epilepticus, not intractable

## 2021-01-04 NOTE — H&P PST ADULT - NSANTHOSAYNRD_GEN_A_CORE
No. RAMON screening performed.  STOP BANG Legend: 0-2 = LOW Risk; 3-4 = INTERMEDIATE Risk; 5-8 = HIGH Risk

## 2021-01-04 NOTE — H&P PST ADULT - ASSESSMENT
25 yr old female with PMH of seizures presents with calculus of left kidney. Pt is scheduled for cystoscopy, left ureteroscopy, laser lithotripsy, stone extraction, stent placement, retrograde pyelogram on 01/04/2021.   25 yr old female with PMH of seizures presents with calculus of left kidney. Pt is scheduled for cystoscopy, left ureteroscopy, laser lithotripsy, stone extraction, stent placement, retrograde pyelogram on 01/14/2021.

## 2021-01-04 NOTE — H&P PST ADULT - NSICDXFAMILYHX_GEN_ALL_CORE_FT
FAMILY HISTORY:  Family history of diabetes mellitus in maternal grandmother  Family history of hypertension  Family history of lung cancer, paternal grandfather    Mother  Still living? Yes, Estimated age: Age Unknown  Family history of factor V deficiency, Age at diagnosis: Age Unknown     FAMILY HISTORY:  Family history of diabetes mellitus in maternal grandmother  Family history of hypertension  Family history of lung cancer, paternal grandfather  FHx: factor V Leiden mutation, mother    Mother  Still living? Yes, Estimated age: Age Unknown  Family history of factor V deficiency, Age at diagnosis: Age Unknown

## 2021-01-05 ENCOUNTER — APPOINTMENT (OUTPATIENT)
Dept: UROLOGY | Facility: CLINIC | Age: 26
End: 2021-01-05

## 2021-01-05 LAB
CULTURE RESULTS: SIGNIFICANT CHANGE UP
SPECIMEN SOURCE: SIGNIFICANT CHANGE UP

## 2021-01-07 DIAGNOSIS — Z01.818 ENCOUNTER FOR OTHER PREPROCEDURAL EXAMINATION: ICD-10-CM

## 2021-01-08 LAB — LAMOTRIGINE SERPL-MCNC: 12.5 UG/ML — SIGNIFICANT CHANGE UP (ref 2–20)

## 2021-01-11 ENCOUNTER — APPOINTMENT (OUTPATIENT)
Dept: DISASTER EMERGENCY | Facility: CLINIC | Age: 26
End: 2021-01-11

## 2021-01-13 ENCOUNTER — TRANSCRIPTION ENCOUNTER (OUTPATIENT)
Age: 26
End: 2021-01-13

## 2021-01-13 LAB — SARS-COV-2 N GENE NPH QL NAA+PROBE: NOT DETECTED

## 2021-01-14 ENCOUNTER — OUTPATIENT (OUTPATIENT)
Dept: OUTPATIENT SERVICES | Facility: HOSPITAL | Age: 26
LOS: 1 days | End: 2021-01-14
Payer: COMMERCIAL

## 2021-01-14 ENCOUNTER — RESULT REVIEW (OUTPATIENT)
Age: 26
End: 2021-01-14

## 2021-01-14 ENCOUNTER — APPOINTMENT (OUTPATIENT)
Dept: UROLOGY | Facility: HOSPITAL | Age: 26
End: 2021-01-14

## 2021-01-14 VITALS
SYSTOLIC BLOOD PRESSURE: 123 MMHG | WEIGHT: 134.92 LBS | OXYGEN SATURATION: 100 % | RESPIRATION RATE: 16 BRPM | DIASTOLIC BLOOD PRESSURE: 68 MMHG | HEART RATE: 110 BPM | HEIGHT: 64 IN | TEMPERATURE: 99 F

## 2021-01-14 VITALS
RESPIRATION RATE: 16 BRPM | SYSTOLIC BLOOD PRESSURE: 131 MMHG | TEMPERATURE: 98 F | DIASTOLIC BLOOD PRESSURE: 66 MMHG | OXYGEN SATURATION: 100 % | HEART RATE: 102 BPM

## 2021-01-14 DIAGNOSIS — Z01.818 ENCOUNTER FOR OTHER PREPROCEDURAL EXAMINATION: ICD-10-CM

## 2021-01-14 DIAGNOSIS — N20.0 CALCULUS OF KIDNEY: ICD-10-CM

## 2021-01-14 DIAGNOSIS — Z90.89 ACQUIRED ABSENCE OF OTHER ORGANS: Chronic | ICD-10-CM

## 2021-01-14 LAB — HCG UR QL: NEGATIVE — SIGNIFICANT CHANGE UP

## 2021-01-14 PROCEDURE — 52356 CYSTO/URETERO W/LITHOTRIPSY: CPT | Mod: LT

## 2021-01-14 PROCEDURE — 74420 UROGRAPHY RTRGR +-KUB: CPT | Mod: 26

## 2021-01-14 PROCEDURE — 88300 SURGICAL PATH GROSS: CPT

## 2021-01-14 PROCEDURE — C1758: CPT

## 2021-01-14 PROCEDURE — C1889: CPT

## 2021-01-14 PROCEDURE — C1769: CPT

## 2021-01-14 PROCEDURE — 81025 URINE PREGNANCY TEST: CPT

## 2021-01-14 PROCEDURE — C2625: CPT

## 2021-01-14 PROCEDURE — 82365 CALCULUS SPECTROSCOPY: CPT

## 2021-01-14 PROCEDURE — 76000 FLUOROSCOPY <1 HR PHYS/QHP: CPT

## 2021-01-14 PROCEDURE — 88300 SURGICAL PATH GROSS: CPT | Mod: 26

## 2021-01-14 RX ORDER — SODIUM CHLORIDE 9 MG/ML
1000 INJECTION, SOLUTION INTRAVENOUS
Refills: 0 | Status: DISCONTINUED | OUTPATIENT
Start: 2021-01-14 | End: 2021-01-21

## 2021-01-14 RX ORDER — ACETAMINOPHEN 500 MG
2 TABLET ORAL
Qty: 0 | Refills: 0 | DISCHARGE

## 2021-01-14 RX ORDER — AZTREONAM 2 G
1 VIAL (EA) INJECTION
Qty: 6 | Refills: 0
Start: 2021-01-14 | End: 2021-01-16

## 2021-01-14 RX ORDER — SODIUM CHLORIDE 9 MG/ML
3 INJECTION INTRAMUSCULAR; INTRAVENOUS; SUBCUTANEOUS EVERY 8 HOURS
Refills: 0 | Status: DISCONTINUED | OUTPATIENT
Start: 2021-01-14 | End: 2021-01-14

## 2021-01-14 RX ORDER — ACETAMINOPHEN 500 MG
650 TABLET ORAL EVERY 6 HOURS
Refills: 0 | Status: DISCONTINUED | OUTPATIENT
Start: 2021-01-14 | End: 2021-01-21

## 2021-01-14 RX ORDER — LAMOTRIGINE 25 MG/1
2 TABLET, ORALLY DISINTEGRATING ORAL
Qty: 0 | Refills: 0 | DISCHARGE

## 2021-01-14 RX ORDER — IBUPROFEN 200 MG
1 TABLET ORAL
Qty: 0 | Refills: 0 | DISCHARGE

## 2021-01-14 RX ORDER — OXYCODONE AND ACETAMINOPHEN 5; 325 MG/1; MG/1
1 TABLET ORAL ONCE
Refills: 0 | Status: DISCONTINUED | OUTPATIENT
Start: 2021-01-14 | End: 2021-01-14

## 2021-01-14 RX ORDER — IBUPROFEN 200 MG
600 TABLET ORAL EVERY 6 HOURS
Refills: 0 | Status: DISCONTINUED | OUTPATIENT
Start: 2021-01-14 | End: 2021-01-21

## 2021-01-14 RX ORDER — ZONISAMIDE 100 MG
4 CAPSULE ORAL
Qty: 0 | Refills: 0 | DISCHARGE

## 2021-01-14 RX ADMIN — Medication 600 MILLIGRAM(S): at 14:40

## 2021-01-14 RX ADMIN — SODIUM CHLORIDE 3 MILLILITER(S): 9 INJECTION INTRAMUSCULAR; INTRAVENOUS; SUBCUTANEOUS at 10:09

## 2021-01-14 RX ADMIN — OXYCODONE AND ACETAMINOPHEN 1 TABLET(S): 5; 325 TABLET ORAL at 15:30

## 2021-01-14 NOTE — ASU DISCHARGE PLAN (ADULT/PEDIATRIC) - CARE PROVIDER_API CALL
Travis Stephenson (MD)  Urology  [address], [address] [place] [address]  Phone: [phone]  Fax: [phone]  Follow Up Time: 1 week

## 2021-01-14 NOTE — ASU DISCHARGE PLAN (ADULT/PEDIATRIC) - ASU DC SPECIAL INSTRUCTIONSFT
You may have intermittent blood tinged urine and slight flank pain when you urinate.  This is normal and due to the stent in your ureter.   If your urine becomes bright red or with clots, please call the office.

## 2021-01-20 ENCOUNTER — APPOINTMENT (OUTPATIENT)
Dept: UROLOGY | Facility: CLINIC | Age: 26
End: 2021-01-20
Payer: COMMERCIAL

## 2021-01-20 VITALS
HEIGHT: 65 IN | WEIGHT: 136 LBS | OXYGEN SATURATION: 95 % | SYSTOLIC BLOOD PRESSURE: 110 MMHG | HEART RATE: 122 BPM | BODY MASS INDEX: 22.66 KG/M2 | DIASTOLIC BLOOD PRESSURE: 77 MMHG | TEMPERATURE: 97.2 F

## 2021-01-20 LAB — NIDUS STONE QN: SIGNIFICANT CHANGE UP

## 2021-01-20 PROCEDURE — 99072 ADDL SUPL MATRL&STAF TM PHE: CPT

## 2021-01-20 PROCEDURE — 52310 CYSTOSCOPY AND TREATMENT: CPT

## 2021-01-21 LAB — SURGICAL PATHOLOGY STUDY: SIGNIFICANT CHANGE UP

## 2021-02-12 ENCOUNTER — APPOINTMENT (OUTPATIENT)
Dept: UROLOGY | Facility: CLINIC | Age: 26
End: 2021-02-12
Payer: COMMERCIAL

## 2021-02-12 PROCEDURE — 99072 ADDL SUPL MATRL&STAF TM PHE: CPT

## 2021-02-12 PROCEDURE — 99214 OFFICE O/P EST MOD 30 MIN: CPT

## 2021-02-12 NOTE — ASSESSMENT
[FreeTextEntry1] : Very pleasant 25-year-old woman who presents for follow-up of kidney stones status post ureteroscopy and stone extraction, new finding of calcium phosphate kidney stone\par -Calculus analysis reviewed with the patient in detail\par -Discussed potential etiologies of calcium phosphate and calcium oxalate containing kidney stones\par -We discussed dietary recommendations to mitigate formation of calcium phosphate and calcium oxalate stones\par -We discussed potential medications which may contribute to kidney stones\par -LithoLink urinalysis\par -CMP\par -PTH\par -TSH\par -Uric acid\par -Follow-up in 1 month

## 2021-02-12 NOTE — HISTORY OF PRESENT ILLNESS
[FreeTextEntry1] : Very pleasant 25-year-old woman who presents for follow-up of kidney stones.  She recently underwent cystoscopy with ureteroscopy and stone extraction.  Biochemical analysis demonstrated calculus analysis of 80% calcium phosphate (apatite) and 20% calcium oxalate dihydrate.  She reports that she is drinking more water.  No problems after the surgery.  No hematuria.  No dysuria.  No other complaints.

## 2021-02-12 NOTE — PHYSICAL EXAM
[General Appearance - Well Developed] : well developed [General Appearance - Well Nourished] : well nourished [Normal Appearance] : normal appearance [Well Groomed] : well groomed [General Appearance - In No Acute Distress] : no acute distress [Abdomen Soft] : soft [Abdomen Tenderness] : non-tender [Costovertebral Angle Tenderness] : no ~M costovertebral angle tenderness [Urinary Bladder Findings] : the bladder was normal on palpation [Edema] : no peripheral edema [] : no respiratory distress [Respiration, Rhythm And Depth] : normal respiratory rhythm and effort [Exaggerated Use Of Accessory Muscles For Inspiration] : no accessory muscle use [Affect] : the affect was normal [Oriented To Time, Place, And Person] : oriented to person, place, and time [Mood] : the mood was normal [Not Anxious] : not anxious [Normal Station and Gait] : the gait and station were normal for the patient's age [No Focal Deficits] : no focal deficits [No Palpable Adenopathy] : no palpable adenopathy

## 2021-02-16 LAB
ALBUMIN SERPL ELPH-MCNC: 5.1 G/DL
ALP BLD-CCNC: 59 U/L
ALT SERPL-CCNC: 14 U/L
ANION GAP SERPL CALC-SCNC: 16 MMOL/L
AST SERPL-CCNC: 13 U/L
BILIRUB SERPL-MCNC: <0.2 MG/DL
BUN SERPL-MCNC: 14 MG/DL
CALCIUM SERPL-MCNC: 10.3 MG/DL
CALCIUM SERPL-MCNC: 10.3 MG/DL
CHLORIDE SERPL-SCNC: 104 MMOL/L
CO2 SERPL-SCNC: 20 MMOL/L
CREAT SERPL-MCNC: 0.91 MG/DL
GLUCOSE SERPL-MCNC: 74 MG/DL
PARATHYROID HORMONE INTACT: 25 PG/ML
POTASSIUM SERPL-SCNC: 4.3 MMOL/L
PROT SERPL-MCNC: 7.8 G/DL
SODIUM SERPL-SCNC: 140 MMOL/L
TSH SERPL-ACNC: 1.09 UIU/ML
URATE SERPL-MCNC: 4.1 MG/DL

## 2021-03-31 ENCOUNTER — APPOINTMENT (OUTPATIENT)
Dept: UROLOGY | Facility: CLINIC | Age: 26
End: 2021-03-31
Payer: COMMERCIAL

## 2021-03-31 PROCEDURE — 99072 ADDL SUPL MATRL&STAF TM PHE: CPT

## 2021-03-31 PROCEDURE — 99214 OFFICE O/P EST MOD 30 MIN: CPT

## 2021-03-31 NOTE — ASSESSMENT
[FreeTextEntry1] : Very pleasant 25-year-old woman who presents for follow-up of history of kidney stones status post left ureteroscopy, laser lithotripsy\par -Litholink demonstrates extremely low volume with high supersaturation of calcium oxalate and supersaturation of calcium phosphate.  Demonstrates high urine pH and low urine citrate.  These findings were confirmed on 2 separate urine collections\par -TSH 1.09\par -PTH 25, calcium 10.3\par -Uric acid 4.1\par -We again discussed general stone prevention strategies, as well as those specific to calcium phosphate and calcium oxalate stones\par -Discussed the need to increase urine volume to greater than 2.5 L/day.  We discussed oxalate and calcium-containing foods.  We discussed the need to maintain a normal calcium diet of between 800 to 1200 mg/day and minimize oxalate containing foods\par -Renal ultrasound for surveillance of stones in approximately 3 weeks\par -I recommended that she see a nephrologist given high urine pH in the setting of low urine citrate

## 2021-03-31 NOTE — HISTORY OF PRESENT ILLNESS
[FreeTextEntry1] : Very pleasant 25-year-old woman who presents for follow-up of history of kidney stones.  She previously underwent a left ureteroscopy for a left ureteral stone which demonstrated a stone composition of 80% calcium phosphate, 20% calcium oxalate.  She recently underwent a work-up for kidney stones and presents today to discuss the results.

## 2021-04-20 ENCOUNTER — APPOINTMENT (OUTPATIENT)
Dept: NEPHROLOGY | Facility: CLINIC | Age: 26
End: 2021-04-20
Payer: COMMERCIAL

## 2021-04-20 VITALS
BODY MASS INDEX: 22.66 KG/M2 | HEIGHT: 65 IN | RESPIRATION RATE: 18 BRPM | SYSTOLIC BLOOD PRESSURE: 125 MMHG | WEIGHT: 136 LBS | HEART RATE: 121 BPM | DIASTOLIC BLOOD PRESSURE: 82 MMHG

## 2021-04-20 PROCEDURE — 99204 OFFICE O/P NEW MOD 45 MIN: CPT

## 2021-04-20 PROCEDURE — 99072 ADDL SUPL MATRL&STAF TM PHE: CPT

## 2021-04-23 ENCOUNTER — APPOINTMENT (OUTPATIENT)
Dept: UROLOGY | Facility: CLINIC | Age: 26
End: 2021-04-23

## 2021-04-23 ENCOUNTER — APPOINTMENT (OUTPATIENT)
Dept: UROLOGY | Facility: CLINIC | Age: 26
End: 2021-04-23
Payer: COMMERCIAL

## 2021-04-23 PROCEDURE — 99072 ADDL SUPL MATRL&STAF TM PHE: CPT

## 2021-04-23 PROCEDURE — 76775 US EXAM ABDO BACK WALL LIM: CPT

## 2021-04-23 PROCEDURE — 99214 OFFICE O/P EST MOD 30 MIN: CPT | Mod: 25

## 2021-04-23 NOTE — REASON FOR VISIT
[Initial Evaluation] : an initial evaluation [Parent] : parent [Other: _____] : [unfilled] [Follow-up Visit ___] : a follow-up visit  for [unfilled]

## 2021-04-23 NOTE — ASSESSMENT
[FreeTextEntry1] : Very pleasant 25-year-old woman who presents for follow-up of kidney stones\par -Ultrasound images reviewed demonstrating no kidney stones, hydronephrosis, renal masses\par -Repeat 24-hour urinalysis\par -Drink plenty of fluids\par -Renal ultrasound in 6 months

## 2021-04-23 NOTE — PHYSICAL EXAM
[General Appearance - Alert] : alert [Sclera] : the sclera and conjunctiva were normal [PERRL With Normal Accommodation] : pupils were equal in size, round, and reactive to light [Extraocular Movements] : extraocular movements were intact [Oropharynx] : the oropharynx was normal [Outer Ear] : the ears and nose were normal in appearance [Neck Appearance] : the appearance of the neck was normal [Neck Cervical Mass (___cm)] : no neck mass was observed [Jugular Venous Distention Increased] : there was no jugular-venous distention [Thyroid Diffuse Enlargement] : the thyroid was not enlarged [Thyroid Nodule] : there were no palpable thyroid nodules [Auscultation Breath Sounds / Voice Sounds] : lungs were clear to auscultation bilaterally [Heart Rate And Rhythm] : heart rate was normal and rhythm regular [Heart Sounds] : normal S1 and S2 [Heart Sounds Gallop] : no gallops [Murmurs] : no murmurs [Heart Sounds Pericardial Friction Rub] : no pericardial rub [Full Pulse] : the pedal pulses are present [Bowel Sounds] : normal bowel sounds [Abdomen Mass (___ Cm)] : no abdominal mass palpated [No CVA Tenderness] : no ~M costovertebral angle tenderness [No Spinal Tenderness] : no spinal tenderness [Abnormal Walk] : normal gait [Nail Clubbing] : no clubbing  or cyanosis of the fingernails [Musculoskeletal - Swelling] : no joint swelling seen [Motor Tone] : muscle strength and tone were normal [Skin Color & Pigmentation] : normal skin color and pigmentation [Skin Turgor] : normal skin turgor [Impaired Insight] : insight and judgment were intact [General Appearance - Well Developed] : well developed [General Appearance - Well Nourished] : well nourished [Normal Appearance] : normal appearance [Well Groomed] : well groomed [General Appearance - In No Acute Distress] : no acute distress [Abdomen Soft] : soft [Abdomen Tenderness] : non-tender [Costovertebral Angle Tenderness] : no ~M costovertebral angle tenderness [Urinary Bladder Findings] : the bladder was normal on palpation [Edema] : no peripheral edema [] : no respiratory distress [Respiration, Rhythm And Depth] : normal respiratory rhythm and effort [Exaggerated Use Of Accessory Muscles For Inspiration] : no accessory muscle use [Oriented To Time, Place, And Person] : oriented to person, place, and time [Affect] : the affect was normal [Mood] : the mood was normal [Not Anxious] : not anxious [Normal Station and Gait] : the gait and station were normal for the patient's age [No Focal Deficits] : no focal deficits [No Palpable Adenopathy] : no palpable adenopathy

## 2021-04-23 NOTE — HISTORY OF PRESENT ILLNESS
[FreeTextEntry1] : Very pleasant 25-year-old woman who presents for follow-up of history of kidney stones.  She previously underwent a left ureteroscopy for a left ureteral stone which demonstrated a stone composition of 80% calcium phosphate, 20% calcium oxalate.  Renal ultrasound today demonstrates no kidney stones, hydronephrosis, renal masses.  She still reports mild left flank/back pain but otherwise feels well. Patient seen and chart reviewed. No events reported over the weekend. No PRNs received. States she spent the weekend on Facebook on her phone. Says she took out her gabriella and will have her hair redone today. Denies SI. Patient seen and chart reviewed. No events reported over the weekend. No PRNs received. States she spent the weekend on Facebook on her phone. Says she took out her gabriella and will have her hair redone today. Denies SI. Expresses reluctance at plan of being discharged to [place] once medically cleared.

## 2021-05-11 ENCOUNTER — RX RENEWAL (OUTPATIENT)
Age: 26
End: 2021-05-11

## 2021-05-11 RX ORDER — LAMOTRIGINE 300 MG/1
300 TABLET, FILM COATED, EXTENDED RELEASE ORAL
Qty: 14 | Refills: 0 | Status: ACTIVE | COMMUNITY
Start: 2017-12-20 | End: 1900-01-01

## 2021-08-19 ENCOUNTER — NON-APPOINTMENT (OUTPATIENT)
Age: 26
End: 2021-08-19

## 2021-09-20 ENCOUNTER — APPOINTMENT (OUTPATIENT)
Dept: NEPHROLOGY | Facility: CLINIC | Age: 26
End: 2021-09-20
Payer: COMMERCIAL

## 2021-09-20 DIAGNOSIS — R82.991 HYPOCITRATURIA: ICD-10-CM

## 2021-09-20 PROCEDURE — 99214 OFFICE O/P EST MOD 30 MIN: CPT | Mod: 95

## 2021-09-20 NOTE — REASON FOR VISIT
[Home] : at home, [unfilled] , at the time of the visit. [Medical Office: ([facility])___] : at the medical office located in  [Mother] : mother [Verbal consent obtained from patient] : the patient, [unfilled] [Follow-Up] : a follow-up visit

## 2021-09-20 NOTE — PHYSICAL EXAM
[General Appearance - Alert] : alert [General Appearance - In No Acute Distress] : in no acute distress [Sclera] : the sclera and conjunctiva were normal [PERRL With Normal Accommodation] : pupils were equal in size, round, and reactive to light [Extraocular Movements] : extraocular movements were intact [Outer Ear] : the ears and nose were normal in appearance [Oropharynx] : the oropharynx was normal [Neck Appearance] : the appearance of the neck was normal [Neck Cervical Mass (___cm)] : no neck mass was observed [Jugular Venous Distention Increased] : there was no jugular-venous distention [Thyroid Diffuse Enlargement] : the thyroid was not enlarged [Thyroid Nodule] : there were no palpable thyroid nodules [Exaggerated Use Of Accessory Muscles For Inspiration] : no accessory muscle use [Edema] : there was no peripheral edema [Skin Color & Pigmentation] : normal skin color and pigmentation [Skin Turgor] : normal skin turgor [] : no rash [Oriented To Time, Place, And Person] : oriented to person, place, and time [Impaired Insight] : insight and judgment were intact [Affect] : the affect was normal

## 2021-10-26 ENCOUNTER — APPOINTMENT (OUTPATIENT)
Dept: UROLOGY | Facility: CLINIC | Age: 26
End: 2021-10-26

## 2021-10-26 ENCOUNTER — APPOINTMENT (OUTPATIENT)
Dept: UROLOGY | Facility: CLINIC | Age: 26
End: 2021-10-26
Payer: COMMERCIAL

## 2021-10-26 ENCOUNTER — RESULT REVIEW (OUTPATIENT)
Age: 26
End: 2021-10-26

## 2021-10-26 DIAGNOSIS — N13.30 UNSPECIFIED HYDRONEPHROSIS: ICD-10-CM

## 2021-10-26 PROCEDURE — 99214 OFFICE O/P EST MOD 30 MIN: CPT | Mod: 25

## 2021-10-26 PROCEDURE — 76775 US EXAM ABDO BACK WALL LIM: CPT

## 2021-10-26 NOTE — ASSESSMENT
[FreeTextEntry1] : Very pleasant 26-year-old woman who presents for follow-up of kidney stones, concern for mild left renal pelvic fullness on renal ultrasound\par -Ultrasound images reviewed with the patient in detail demonstrating concern for mild left renal pelvic fullness\par -Given mild ongoing left flank pain as well as concern for renal pelvic fullness on ultrasound we will do a CT scan at this time\par -Discussed risks and benefits of CT scan stone hunt\par -Litholink urinalysis results reviewed demonstrating low urine volume at 1.23 L, low urine citrate, pH 6.9\par -We discussed the need to increase fluid intake to increase urine volume above 2.5 L/day\par -Follow-up after CT scan

## 2021-10-26 NOTE — HISTORY OF PRESENT ILLNESS
[FreeTextEntry1] : Very pleasant 26-year-old woman who presents for follow-up of left kidney stones, concern for mild left renal pelvic fullness on ultrasound.  She underwent a renal ultrasound today which demonstrates concern for mild left renal pelvic fullness without overt hydronephrosis.  He does not demonstrate stones, bilaterally.  She reports that she is trying to increase her water intake.  Her recent urinalysis demonstrated a volume of 1.24 with a urine pH of 6.9 and a low urine citrate.

## 2021-11-06 ENCOUNTER — APPOINTMENT (OUTPATIENT)
Dept: CT IMAGING | Facility: IMAGING CENTER | Age: 26
End: 2021-11-06
Payer: COMMERCIAL

## 2021-11-06 ENCOUNTER — OUTPATIENT (OUTPATIENT)
Dept: OUTPATIENT SERVICES | Facility: HOSPITAL | Age: 26
LOS: 1 days | End: 2021-11-06
Payer: COMMERCIAL

## 2021-11-06 DIAGNOSIS — Z00.8 ENCOUNTER FOR OTHER GENERAL EXAMINATION: ICD-10-CM

## 2021-11-06 DIAGNOSIS — Z90.89 ACQUIRED ABSENCE OF OTHER ORGANS: Chronic | ICD-10-CM

## 2021-11-06 DIAGNOSIS — N20.0 CALCULUS OF KIDNEY: ICD-10-CM

## 2021-11-06 PROCEDURE — 74176 CT ABD & PELVIS W/O CONTRAST: CPT | Mod: 26

## 2021-11-06 PROCEDURE — 74176 CT ABD & PELVIS W/O CONTRAST: CPT

## 2021-11-17 ENCOUNTER — APPOINTMENT (OUTPATIENT)
Dept: UROLOGY | Facility: CLINIC | Age: 26
End: 2021-11-17
Payer: COMMERCIAL

## 2021-11-17 PROCEDURE — 99213 OFFICE O/P EST LOW 20 MIN: CPT

## 2021-11-17 NOTE — HISTORY OF PRESENT ILLNESS
[FreeTextEntry1] : Very pleasant 26-year-old woman who presents for follow-up of left kidney stones, concern for mild left renal pelvic fullness on ultrasound.  She underwent a renal ultrasound approximately 3 weeks ago which demonstrated concern for mild left renal pelvic fullness without overt hydronephrosis.  Because of this she underwent a CT scan which demonstrated no kidney stones bilaterally, renal masses, hydronephrosis.  She reports mild left back pain.  No other complaints.

## 2021-11-17 NOTE — ASSESSMENT
[FreeTextEntry1] : Very pleasant 26-year-old woman who presents for follow-up of kidney stones\par -CT images reviewed with the patient demonstrating no kidney stones, hydronephrosis, renal masses\par -We discussed potential etiologies of mild left back pain\par -Renal ultrasound for surveillance of kidney stones in 6 months

## 2021-12-23 ENCOUNTER — NON-APPOINTMENT (OUTPATIENT)
Age: 26
End: 2021-12-23

## 2022-01-21 ENCOUNTER — NON-APPOINTMENT (OUTPATIENT)
Age: 27
End: 2022-01-21

## 2022-01-21 ENCOUNTER — APPOINTMENT (OUTPATIENT)
Dept: INTERNAL MEDICINE | Facility: CLINIC | Age: 27
End: 2022-01-21
Payer: COMMERCIAL

## 2022-01-21 VITALS
SYSTOLIC BLOOD PRESSURE: 120 MMHG | HEIGHT: 65 IN | TEMPERATURE: 98.2 F | BODY MASS INDEX: 24.49 KG/M2 | DIASTOLIC BLOOD PRESSURE: 81 MMHG | WEIGHT: 147 LBS | HEART RATE: 111 BPM | OXYGEN SATURATION: 100 %

## 2022-01-21 DIAGNOSIS — Z23 ENCOUNTER FOR IMMUNIZATION: ICD-10-CM

## 2022-01-21 DIAGNOSIS — Z83.2 FAMILY HISTORY OF DISEASES OF THE BLOOD AND BLOOD-FORMING ORGANS AND CERTAIN DISORDERS INVOLVING THE IMMUNE MECHANISM: ICD-10-CM

## 2022-01-21 DIAGNOSIS — Z11.59 ENCOUNTER FOR SCREENING FOR OTHER VIRAL DISEASES: ICD-10-CM

## 2022-01-21 PROCEDURE — G0008: CPT

## 2022-01-21 PROCEDURE — 36415 COLL VENOUS BLD VENIPUNCTURE: CPT

## 2022-01-21 PROCEDURE — 99395 PREV VISIT EST AGE 18-39: CPT | Mod: 25

## 2022-01-21 PROCEDURE — 90686 IIV4 VACC NO PRSV 0.5 ML IM: CPT

## 2022-01-21 RX ORDER — FOLIC ACID 1 MG/1
1 TABLET ORAL DAILY
Qty: 30 | Refills: 5 | Status: DISCONTINUED | COMMUNITY
Start: 2019-08-26 | End: 2022-01-21

## 2022-01-21 RX ORDER — ONDANSETRON 8 MG/1
8 TABLET ORAL
Qty: 9 | Refills: 20 | Status: DISCONTINUED | COMMUNITY
Start: 2017-12-20 | End: 2022-01-21

## 2022-01-21 RX ORDER — SULFAMETHOXAZOLE AND TRIMETHOPRIM 800; 160 MG/1; MG/1
800-160 TABLET ORAL
Qty: 6 | Refills: 0 | Status: DISCONTINUED | COMMUNITY
Start: 2021-01-14 | End: 2022-01-21

## 2022-01-21 RX ORDER — FROVATRIPTAN SUCCINATE 2.5 MG/1
2.5 TABLET, FILM COATED ORAL
Qty: 8 | Refills: 1 | Status: DISCONTINUED | COMMUNITY
Start: 2019-03-07 | End: 2022-01-21

## 2022-01-21 NOTE — HISTORY OF PRESENT ILLNESS
[FreeTextEntry1] : 26-year-old female presents for annual physical [de-identified] : 26-year-old female presents for annual physical\par Has a history of seizures related to menstrual cycle, currently on medication seizure-free for approximately 5 years follows with neurology\par Did have a renal calculus following with urology denies any flank pain chest pain chest tightness shortness of breath\par Attending to watch diet eat as healthy as possible\par Follows with gynecology annually

## 2022-01-21 NOTE — HEALTH RISK ASSESSMENT
[Good] : ~his/her~  mood as  good [FreeTextEntry1] : Health maintenance [Never] : Never [No] : No [de-identified] : Urology nephrology neurology

## 2022-01-21 NOTE — ASSESSMENT
[FreeTextEntry1] : Blood work done today\par Check factor V Leiden given family history\par Advised at least to 2-1/2 L of water daily\par Discussed healthy diet and exercise

## 2022-01-27 LAB
25(OH)D3 SERPL-MCNC: 18.4 NG/ML
ALBUMIN SERPL ELPH-MCNC: 5.1 G/DL
ALP BLD-CCNC: 57 U/L
ALT SERPL-CCNC: 13 U/L
ANION GAP SERPL CALC-SCNC: 13 MMOL/L
AST SERPL-CCNC: 14 U/L
BASOPHILS # BLD AUTO: 0.02 K/UL
BASOPHILS NFR BLD AUTO: 0.4 %
BILIRUB SERPL-MCNC: 0.2 MG/DL
BUN SERPL-MCNC: 11 MG/DL
CALCIUM SERPL-MCNC: 9.8 MG/DL
CHLORIDE SERPL-SCNC: 107 MMOL/L
CHOLEST SERPL-MCNC: 194 MG/DL
CO2 SERPL-SCNC: 21 MMOL/L
COVID-19 NUCLEOCAPSID  GAM ANTIBODY INTERPRETATION: POSITIVE
COVID-19 SPIKE DOMAIN ANTIBODY INTERPRETATION: POSITIVE
CREAT SERPL-MCNC: 0.91 MG/DL
DNA PLOIDY SPEC FC-IMP: NORMAL
EOSINOPHIL # BLD AUTO: 0.07 K/UL
EOSINOPHIL NFR BLD AUTO: 1.4 %
ESTIMATED AVERAGE GLUCOSE: 97 MG/DL
GLUCOSE SERPL-MCNC: 94 MG/DL
HBA1C MFR BLD HPLC: 5 %
HCT VFR BLD CALC: 41.7 %
HDLC SERPL-MCNC: 41 MG/DL
HGB BLD-MCNC: 13.2 G/DL
IMM GRANULOCYTES NFR BLD AUTO: 0.2 %
LDLC SERPL CALC-MCNC: 138 MG/DL
LYMPHOCYTES # BLD AUTO: 1.16 K/UL
LYMPHOCYTES NFR BLD AUTO: 23.5 %
MAN DIFF?: NORMAL
MCHC RBC-ENTMCNC: 29.2 PG
MCHC RBC-ENTMCNC: 31.7 GM/DL
MCV RBC AUTO: 92.3 FL
MONOCYTES # BLD AUTO: 0.38 K/UL
MONOCYTES NFR BLD AUTO: 7.7 %
NEUTROPHILS # BLD AUTO: 3.3 K/UL
NEUTROPHILS NFR BLD AUTO: 66.8 %
NONHDLC SERPL-MCNC: 152 MG/DL
PLATELET # BLD AUTO: 287 K/UL
POTASSIUM SERPL-SCNC: 5 MMOL/L
PROT SERPL-MCNC: 7.7 G/DL
RBC # BLD: 4.52 M/UL
RBC # FLD: 14.2 %
SARS-COV-2 AB SERPL IA-ACNC: >250 U/ML
SARS-COV-2 AB SERPL QL IA: 36.4 INDEX
SODIUM SERPL-SCNC: 141 MMOL/L
TRIGL SERPL-MCNC: 72 MG/DL
TSH SERPL-ACNC: 0.99 UIU/ML
VIT B12 SERPL-MCNC: 881 PG/ML
WBC # FLD AUTO: 4.94 K/UL

## 2022-04-17 ENCOUNTER — EMERGENCY (EMERGENCY)
Facility: HOSPITAL | Age: 27
LOS: 1 days | Discharge: HOME | End: 2022-04-17
Attending: EMERGENCY MEDICINE
Payer: COMMERCIAL

## 2022-04-17 VITALS
OXYGEN SATURATION: 100 % | SYSTOLIC BLOOD PRESSURE: 134 MMHG | TEMPERATURE: 98 F | HEIGHT: 64 IN | HEART RATE: 118 BPM | RESPIRATION RATE: 16 BRPM | WEIGHT: 139.99 LBS | DIASTOLIC BLOOD PRESSURE: 82 MMHG

## 2022-04-17 VITALS
DIASTOLIC BLOOD PRESSURE: 61 MMHG | OXYGEN SATURATION: 98 % | SYSTOLIC BLOOD PRESSURE: 101 MMHG | HEART RATE: 98 BPM | TEMPERATURE: 98 F | RESPIRATION RATE: 17 BRPM

## 2022-04-17 DIAGNOSIS — Z90.89 ACQUIRED ABSENCE OF OTHER ORGANS: Chronic | ICD-10-CM

## 2022-04-17 PROCEDURE — 73590 X-RAY EXAM OF LOWER LEG: CPT

## 2022-04-17 PROCEDURE — 72125 CT NECK SPINE W/O DYE: CPT | Mod: 26,MA

## 2022-04-17 PROCEDURE — 71046 X-RAY EXAM CHEST 2 VIEWS: CPT | Mod: 26

## 2022-04-17 PROCEDURE — 70450 CT HEAD/BRAIN W/O DYE: CPT | Mod: 26,MA

## 2022-04-17 PROCEDURE — 71046 X-RAY EXAM CHEST 2 VIEWS: CPT

## 2022-04-17 PROCEDURE — 99284 EMERGENCY DEPT VISIT MOD MDM: CPT | Mod: 25

## 2022-04-17 PROCEDURE — 99285 EMERGENCY DEPT VISIT HI MDM: CPT

## 2022-04-17 PROCEDURE — 73562 X-RAY EXAM OF KNEE 3: CPT | Mod: 26,RT

## 2022-04-17 PROCEDURE — 73562 X-RAY EXAM OF KNEE 3: CPT

## 2022-04-17 PROCEDURE — 70450 CT HEAD/BRAIN W/O DYE: CPT | Mod: MA

## 2022-04-17 PROCEDURE — 73590 X-RAY EXAM OF LOWER LEG: CPT | Mod: 26,LT

## 2022-04-17 PROCEDURE — 72125 CT NECK SPINE W/O DYE: CPT | Mod: MA

## 2022-04-17 NOTE — ED ADULT NURSE NOTE - NSICDXPASTMEDICALHX_GEN_ALL_CORE_FT
PAST MEDICAL HISTORY:  Calculus of left kidney     Epilepsy with status epilepticus, not intractable

## 2022-04-17 NOTE — ED ADULT NURSE NOTE - NSICDXFAMILYHX_GEN_ALL_CORE_FT
FAMILY HISTORY:  Family history of diabetes mellitus in maternal grandmother  Family history of hypertension  Family history of lung cancer, paternal grandfather  FHx: factor V Leiden mutation, mother    Mother  Still living? Yes, Estimated age: Age Unknown  Family history of factor V deficiency, Age at diagnosis: Age Unknown
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
## 2022-04-17 NOTE — ED PROVIDER NOTE - NS ED ROS FT
Constitutional:  See HPI  Eyes:  No visual changes  ENMT: No neck pain or stiffness  Cardiac:  mild achy pain over L lateral ribs  Respiratory:  No cough or respiratory distress.   GI:  No nausea, vomiting, diarrhea or abdominal pain.  SKIN/MS:  No back pain. +L shin & R knee bruising and pain, ambulatory & able to wt bear  Neuro:  No headache   Except as documented in the HPI,  all other systems are negative

## 2022-04-17 NOTE — ED PROVIDER NOTE - CARE PLAN
1 Principal Discharge DX:	Contusion  Secondary Diagnosis:	Left leg pain  Secondary Diagnosis:	Right knee pain
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
## 2022-04-17 NOTE — ED PROVIDER NOTE - PATIENT PORTAL LINK FT
You can access the FollowMyHealth Patient Portal offered by [facility] by registering at the following website: [email or web address]. By joining "[employer]’s FollowMyHealth portal, you will also be able to view your health information using other applications (apps) compatible with our system.

## 2022-04-17 NOTE — ED PROVIDER NOTE - PHYSICAL EXAMINATION
CONSTITUTIONAL: NAD  SKIN: Warm dry, bruise over L forehead, faint bruise over L lat ribs ~6-8, bruise over L shin and R medial knee.   HEAD: NCAT  EYES: NL inspection  ENT: MMM  NECK: Supple; non tender midline  CARD: RRR  RESP: CTAB clear to both bases  ABD: S/NT no R/G w/o bruising   EXT: no pedal edema, no bony deformity, able to weight bear and ambulate in ED  NEURO: Grossly unremarkable  PSYCH: Cooperative, appropriate.
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
## 2022-04-17 NOTE — ED PROVIDER NOTE - NSFOLLOWUPCLINICS_GEN_ALL_ED_FT
[facility] Sports Concussion Program  Concussion  [address] [place] [address]  Phone: [phone]  Fax:

## 2022-04-17 NOTE — ED PROVIDER NOTE - NSFOLLOWUPINSTRUCTIONS_ED_ALL_ED_FT
Concussion    WHAT YOU NEED TO KNOW:    A concussion is a mild brain injury. It is usually caused by a bump or blow to the head from a fall, a motor vehicle crash, or a sports injury. Sometimes being shaken forcefully may cause a concussion.    DISCHARGE INSTRUCTIONS:    Have someone call 911 for any of the following:   •You cannot be woken.  •You have a seizure, increasing confusion, or a change in personality.  •Your speech becomes slurred, or you have new vision problems.    Seek care immediately if:   •You have sudden and new vision problems.  •You have a severe headache that does not go away.  •You have arm or leg weakness, numbness, or new problems with coordination.  •You have blood or clear fluid coming out of the ears or nose.    Contact your healthcare provider if:   •You have nausea or are vomiting.  •You feel more sleepy than usual.  •Your symptoms get worse.  •Your symptoms last longer than 6 weeks after the injury.  •You have questions or concerns about your condition or care.    Medicines: You may need any of the following:   •Acetaminophen decreases pain and fever. It is available without a doctor's order. Ask how much to take and how often to take it. Follow directions. Read the labels of all other medicines you are using to see if they also contain acetaminophen, or ask your doctor or pharmacist. Acetaminophen can cause liver damage if not taken correctly. Do not use more than 4 grams (4,000 milligrams) total of acetaminophen in one day.   •NSAIDs help decrease swelling and pain or fever. This medicine is available with or without a doctor's order. NSAIDs can cause stomach bleeding or kidney problems in certain people. If you take blood thinner medicine, always ask your healthcare provider if NSAIDs are safe for you. Always read the medicine label and follow directions.  •Take your medicine as directed. Contact your healthcare provider if you think your medicine is not helping or if you have side effects. Tell him or her if you are allergic to any medicine. Keep a list of the medicines, vitamins, and herbs you take. Include the amounts, and when and why you take them. Bring the list or the pill bottles to follow-up visits. Carry your medicine list with you in case of an emergency.      Self-care: Concussion symptoms usually go away within about 10 days, but they may last longer. The following may be recommended to manage your symptoms:   •Rest from physical and mental activities as directed. Mental activities are those that require thinking, concentration, and attention. You will need to rest until your symptoms are gone. Rest will allow you to recover from your concussion. Ask your healthcare provider when you can return to work and other daily activities.  •Have someone stay with you for the first 24 hours after your injury. Your healthcare provider should be contacted if your symptoms get worse, or you develop new symptoms.  •Do not participate in sports and physical activities until your healthcare provider says it is okay. They could make your symptoms worse or lead to another concussion. Your healthcare provider will tell you when it is okay for you to return to sports or physical activities. Ask for more information about sports concussions.    Prevent another concussion:   •Wear protective sports equipment that fits properly. Helmets help decrease your risk for a serious brain injury. Talk to your healthcare provider about ways that can decrease your risk for a concussion if you play sports.  •Wear your seatbelt every time you travel. This helps to decrease your risk for a head injury if you are in a car accident.       Advance activity as tolerated.  Continue all previously prescribed medications as directed unless otherwise instructed.  Follow up with your primary care physician in 48-72 hours- bring copies of your results.  Return to the ER for worsening or persistent symptoms, and/or ANY NEW OR CONCERNING SYMPTOMS. If you have issues obtaining follow up, please call: [phone] to obtain a doctor or specialist who takes your insurance in your area.  You may call [phone] to make an appointment with the internal medicine clinic.
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
## 2022-05-18 ENCOUNTER — APPOINTMENT (OUTPATIENT)
Dept: UROLOGY | Facility: CLINIC | Age: 27
End: 2022-05-18
Payer: COMMERCIAL

## 2022-05-18 PROCEDURE — 99213 OFFICE O/P EST LOW 20 MIN: CPT | Mod: 25

## 2022-05-18 PROCEDURE — 76775 US EXAM ABDO BACK WALL LIM: CPT

## 2022-05-18 NOTE — ASSESSMENT
[FreeTextEntry1] : Very pleasant 27-year-old woman presents for follow-up of kidney stones\par -Ultrasound images reviewed with the patient demonstrating no kidney stones, hydronephrosis, renal masses\par -Prior CT scan demonstrates no hydronephrosis, renal masses, kidney stones\par -We discussed alternative etiologies of flank pain\par -We discussed the option of perform a CT scan at this time given persistent left flank pain, however I advised against this given recent negative CT scan, current negative renal ultrasound, and multiple CT scans in the past\par -Repeat renal ultrasound in 6 months and follow-up at that time

## 2022-05-18 NOTE — HISTORY OF PRESENT ILLNESS
[FreeTextEntry1] : Very pleasant 27-year-old woman presents for follow-up of kidney stones.  She feels well.  She reports intermittent left flank pain.  She denies dysuria.  No hematuria.  No nausea or vomiting.  She underwent a CT scan in November 2021 which demonstrated no kidney stones, hydronephrosis, renal masses.  She underwent a surveillance renal ultrasound today which demonstrated no kidney stones, hydronephrosis, renal masses.  No other complaints.

## 2022-06-14 NOTE — ED CDU PROVIDER INITIAL DAY NOTE - ATTENDING CONTRIBUTION TO CARE
I have personally performed a face to face diagnostic evaluation on this patient.  I have reviewed the ACP note and agree with the history, exam, and plan of care, except as noted.  History and Exam by me shows  See Ed provider note  Fenrando Bunch MD, Facep Rituxan Pregnancy And Lactation Text: This medication is Pregnancy Category C and it isn't know if it is safe during pregnancy. It is unknown if this medication is excreted in breast milk but similar antibodies are known to be excreted.

## 2022-11-16 ENCOUNTER — APPOINTMENT (OUTPATIENT)
Dept: UROLOGY | Facility: CLINIC | Age: 27
End: 2022-11-16

## 2022-11-16 PROCEDURE — 76775 US EXAM ABDO BACK WALL LIM: CPT

## 2022-11-16 PROCEDURE — 99213 OFFICE O/P EST LOW 20 MIN: CPT

## 2022-11-16 NOTE — ASSESSMENT
[FreeTextEntry1] : Very pleasant 27-year-old woman who presents for follow-up of kidney stones\par -Renal ultrasound images reviewed with patient demonstrating no kidney stones, hydronephrosis no renal masses\par -Follow-up in 1 year with repeat renal ultrasound

## 2022-11-16 NOTE — HISTORY OF PRESENT ILLNESS
[FreeTextEntry1] : Very pleasant 27-year-old woman who presents for follow-up of kidney stones.  She feels well.  She denies dysuria.  No hematuria.  No flank pain or suprapubic pain.  No nausea or vomiting.  She underwent a renal ultrasound today which demonstrates no kidney stones, hydronephrosis, no renal masses.

## 2023-01-09 ENCOUNTER — NON-APPOINTMENT (OUTPATIENT)
Age: 28
End: 2023-01-09

## 2023-01-25 ENCOUNTER — APPOINTMENT (OUTPATIENT)
Dept: INTERNAL MEDICINE | Facility: CLINIC | Age: 28
End: 2023-01-25

## 2023-02-02 ENCOUNTER — APPOINTMENT (OUTPATIENT)
Dept: INTERNAL MEDICINE | Facility: CLINIC | Age: 28
End: 2023-02-02
Payer: COMMERCIAL

## 2023-02-02 VITALS
DIASTOLIC BLOOD PRESSURE: 67 MMHG | HEART RATE: 101 BPM | WEIGHT: 142 LBS | OXYGEN SATURATION: 99 % | TEMPERATURE: 97.2 F | BODY MASS INDEX: 23.66 KG/M2 | SYSTOLIC BLOOD PRESSURE: 106 MMHG | HEIGHT: 65 IN

## 2023-02-02 PROCEDURE — 36415 COLL VENOUS BLD VENIPUNCTURE: CPT

## 2023-02-02 PROCEDURE — 99385 PREV VISIT NEW AGE 18-39: CPT | Mod: 25

## 2023-02-02 NOTE — HEALTH RISK ASSESSMENT
[Good] : ~his/her~  mood as  good [No] : In the past 12 months have you used drugs other than those required for medical reasons? No [0] : 2) Feeling down, depressed, or hopeless: Not at all (0) [Never] : Never [FreeTextEntry1] : Health maintenance

## 2023-02-02 NOTE — ADDENDUM
[FreeTextEntry1] : I, Latasha Ibanez, acted as a scribe on behalf of Dr. Kiet Carcamo MD, on 02/02/2023. \par \par All medical entries made by the scribe were at my, Dr. Kiet Carcamo MD, direction and personally dictated by me on 02/02/2023. I have reviewed the chart and agree that the record accurately reflects my personal performance of the history, physical exam, assessment and plan. I have also personally directed, reviewed, and agreed with the chart.

## 2023-02-02 NOTE — ASSESSMENT
[FreeTextEntry1] : Annual Physical Exam\par -Blood work done today\par -Check A1c, lipid panel and Vitamin levels.\par -VS and Physical Exam WNL.\par -Continue to f/u with neuro and uro.\par -Continue with weight management and healthy diet.\par -RTO annually or as needed.

## 2023-02-02 NOTE — HISTORY OF PRESENT ILLNESS
[FreeTextEntry1] : Patient presents for annual physical exam. [de-identified] : Patient is doing well overall and has not gotten sick since last visit.\par Patient continues to f/u with Neurology and urology. Denies any recent seizures.\par Denies any CP, Chest tightness or SOB.\par Denies any abdominal pain, urinary symptom or change in bowel habits.\par Denies any fever, night sweats, or chills.\par Weight has remained stable.

## 2023-02-06 LAB
25(OH)D3 SERPL-MCNC: 15.6 NG/ML
ALBUMIN SERPL ELPH-MCNC: 4.8 G/DL
ALP BLD-CCNC: 51 U/L
ALT SERPL-CCNC: 14 U/L
ANION GAP SERPL CALC-SCNC: 13 MMOL/L
APPEARANCE: CLEAR
AST SERPL-CCNC: 11 U/L
BACTERIA: NEGATIVE
BASOPHILS # BLD AUTO: 0.02 K/UL
BASOPHILS NFR BLD AUTO: 0.4 %
BILIRUB SERPL-MCNC: 0.2 MG/DL
BILIRUBIN URINE: NEGATIVE
BLOOD URINE: NEGATIVE
BUN SERPL-MCNC: 13 MG/DL
CALCIUM SERPL-MCNC: 9.6 MG/DL
CHLORIDE SERPL-SCNC: 106 MMOL/L
CHOLEST SERPL-MCNC: 204 MG/DL
CO2 SERPL-SCNC: 21 MMOL/L
COLOR: NORMAL
CREAT SERPL-MCNC: 0.91 MG/DL
EGFR: 90 ML/MIN/1.73M2
EOSINOPHIL # BLD AUTO: 0.08 K/UL
EOSINOPHIL NFR BLD AUTO: 1.8 %
ESTIMATED AVERAGE GLUCOSE: 97 MG/DL
GLUCOSE QUALITATIVE U: NEGATIVE
GLUCOSE SERPL-MCNC: 87 MG/DL
HBA1C MFR BLD HPLC: 5 %
HCT VFR BLD CALC: 39.7 %
HDLC SERPL-MCNC: 42 MG/DL
HGB BLD-MCNC: 12.9 G/DL
HYALINE CASTS: 2 /LPF
IMM GRANULOCYTES NFR BLD AUTO: 0.2 %
KETONES URINE: NEGATIVE
LDLC SERPL CALC-MCNC: 148 MG/DL
LEUKOCYTE ESTERASE URINE: ABNORMAL
LYMPHOCYTES # BLD AUTO: 1.63 K/UL
LYMPHOCYTES NFR BLD AUTO: 36.1 %
MAN DIFF?: NORMAL
MCHC RBC-ENTMCNC: 29.3 PG
MCHC RBC-ENTMCNC: 32.5 GM/DL
MCV RBC AUTO: 90.2 FL
MICROSCOPIC-UA: NORMAL
MONOCYTES # BLD AUTO: 0.28 K/UL
MONOCYTES NFR BLD AUTO: 6.2 %
NEUTROPHILS # BLD AUTO: 2.5 K/UL
NEUTROPHILS NFR BLD AUTO: 55.3 %
NITRITE URINE: NEGATIVE
NONHDLC SERPL-MCNC: 162 MG/DL
PH URINE: 6.5
PLATELET # BLD AUTO: 273 K/UL
POTASSIUM SERPL-SCNC: 4.1 MMOL/L
PROT SERPL-MCNC: 7.3 G/DL
PROTEIN URINE: NEGATIVE
RBC # BLD: 4.4 M/UL
RBC # FLD: 13.8 %
RED BLOOD CELLS URINE: 10 /HPF
SODIUM SERPL-SCNC: 141 MMOL/L
SPECIFIC GRAVITY URINE: 1.02
SQUAMOUS EPITHELIAL CELLS: 12 /HPF
TRIGL SERPL-MCNC: 69 MG/DL
TSH SERPL-ACNC: 1.37 UIU/ML
UROBILINOGEN URINE: NORMAL
VIT B12 SERPL-MCNC: 610 PG/ML
WBC # FLD AUTO: 4.52 K/UL
WHITE BLOOD CELLS URINE: 13 /HPF

## 2023-02-09 LAB
LAMOTRIGINE SERPL-MCNC: 14.4 UG/ML
ZONISAMIDE SERPL-MCNC: 34.8 UG/ML

## 2023-02-13 ENCOUNTER — NON-APPOINTMENT (OUTPATIENT)
Age: 28
End: 2023-02-13

## 2023-05-02 NOTE — ED ADULT NURSE NOTE - PAIN: DESCRIPTION (FREQUENCY/QUALITY)
Quality 226: Preventive Care And Screening: Tobacco Use: Screening And Cessation Intervention: Patient screened for tobacco use and is an ex/non-smoker
Quality 130: Documentation Of Current Medications In The Medical Record: Current Medications Documented
Detail Level: Detailed
Quality 111:Pneumonia Vaccination Status For Older Adults: Patient received any pneumococcal conjugate or polysaccharide vaccine on or after their 60th birthday and before the end of the measurement period
stabbing

## 2023-11-21 ENCOUNTER — APPOINTMENT (OUTPATIENT)
Dept: UROLOGY | Facility: CLINIC | Age: 28
End: 2023-11-21
Payer: COMMERCIAL

## 2023-11-21 VITALS
WEIGHT: 142 LBS | SYSTOLIC BLOOD PRESSURE: 142 MMHG | BODY MASS INDEX: 23.66 KG/M2 | OXYGEN SATURATION: 98 % | HEIGHT: 65 IN | TEMPERATURE: 96.8 F | HEART RATE: 114 BPM | DIASTOLIC BLOOD PRESSURE: 83 MMHG

## 2023-11-21 DIAGNOSIS — Z87.442 PERSONAL HISTORY OF URINARY CALCULI: ICD-10-CM

## 2023-11-21 PROCEDURE — 99214 OFFICE O/P EST MOD 30 MIN: CPT

## 2023-12-20 ENCOUNTER — APPOINTMENT (OUTPATIENT)
Dept: UROLOGY | Facility: CLINIC | Age: 28
End: 2023-12-20
Payer: COMMERCIAL

## 2023-12-20 DIAGNOSIS — R10.9 UNSPECIFIED ABDOMINAL PAIN: ICD-10-CM

## 2023-12-20 DIAGNOSIS — N20.0 CALCULUS OF KIDNEY: ICD-10-CM

## 2023-12-20 PROCEDURE — 99443: CPT

## 2023-12-20 NOTE — HISTORY OF PRESENT ILLNESS
[Home] : at home, [unfilled] , at the time of the visit. [Medical Office: ([facility])___] : at the medical office located in  [Verbal consent obtained from patient] : the patient, [unfilled] [FreeTextEntry1] : The patient-doctor relationship has been established via real time audio communication using telemedicine software.  She has requested care to be assessed and treated through telemedicine. She understands that there may be limitations in this process and that she may need further follow up care in the office and/or hospital setting.  Very pleasant 28-year-old woman who presents for follow-up of kidney stones, left flank pain.  She recently underwent a CT scan because of left flank pain and history of kidney stones.  This demonstrated a 6 mm left renal cyst but no kidney stones, bilaterally.  She denies hematuria.  No dysuria.  She reports persistent left flank/back pain.  No other complaints.

## 2023-12-20 NOTE — ASSESSMENT
[FreeTextEntry1] : Very pleasant 28-year-old gentleman who presents for follow-up of kidney stones, left flank pain -CT images from anterior radiology reviewed demonstrating no kidney stones, hydronephrosis, renal masses but it does demonstrate a 6 mm benign-appearing left renal cyst -We discussed the benign nature of renal cysts -Repeat renal ultrasound in 6 months and follow-up at that time

## 2024-01-04 NOTE — ASSESSMENT
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 Only responds to brand medications and has had breakthrough seizures on generics. Has good seizure control on current meds, no seizures since 10/2016. She has been having monthly migraines along with vomiting and aura. Non focal neuro exam, developing normally. 04-Jan-2024 13:18

## 2024-02-05 ENCOUNTER — LABORATORY RESULT (OUTPATIENT)
Age: 29
End: 2024-02-05

## 2024-02-05 ENCOUNTER — APPOINTMENT (OUTPATIENT)
Dept: INTERNAL MEDICINE | Facility: CLINIC | Age: 29
End: 2024-02-05
Payer: COMMERCIAL

## 2024-02-05 VITALS
DIASTOLIC BLOOD PRESSURE: 82 MMHG | WEIGHT: 154 LBS | SYSTOLIC BLOOD PRESSURE: 121 MMHG | HEIGHT: 65 IN | BODY MASS INDEX: 25.66 KG/M2 | TEMPERATURE: 97.2 F | OXYGEN SATURATION: 98 % | HEART RATE: 92 BPM

## 2024-02-05 DIAGNOSIS — Z00.00 ENCOUNTER FOR GENERAL ADULT MEDICAL EXAMINATION W/OUT ABNORMAL FINDINGS: ICD-10-CM

## 2024-02-05 DIAGNOSIS — G40.B19 JUVENILE MYOCLONIC EPILEPSY, INTRACTABLE, W/OUT STATUS EPILEPTICUS: ICD-10-CM

## 2024-02-05 PROCEDURE — 99395 PREV VISIT EST AGE 18-39: CPT | Mod: 25

## 2024-02-05 PROCEDURE — 36415 COLL VENOUS BLD VENIPUNCTURE: CPT

## 2024-02-05 NOTE — HISTORY OF PRESENT ILLNESS
[FreeTextEntry1] : Patient presents for annual physical exam. [de-identified] : Patient is doing well overall and has not gotten sick since last visit. Wishes to check Lamotrigine levels. Denies any CP, chest tightness or SOB. Denies any abdominal pain, urinary symptom or change in bowel habits. Denies any fever, chills or night sweats.  Does get occasional back pain, did have imaging no stones, will f/u for renal cyst Back pain improves when changing posture

## 2024-02-05 NOTE — ADDENDUM
[FreeTextEntry1] : I, Latasha Ibanez, acted as a scribe on behalf of Dr. Kiet Carcamo MD, on 02/05/2024.   All medical entries made by the scribe were at my, Dr. Kiet Carcamo MD, direction and personally dictated by me on 02/05/2024. I have reviewed the chart and agree that the record accurately reflects my personal performance of the history, physical exam, assessment and plan. I have also personally directed, reviewed, and agreed with the chart.

## 2024-02-05 NOTE — HEALTH RISK ASSESSMENT
[Good] : ~his/her~  mood as  good [No] : In the past 12 months have you used drugs other than those required for medical reasons? No [Never] : Never [FreeTextEntry1] : Health Maintenance

## 2024-02-05 NOTE — ASSESSMENT
[FreeTextEntry1] : Annual Physical Exam -BP is stable.  -Check A1c, lipid panel and Vitamin levels. -Check Antiepileptic levels. -Continue with healthy diet and weight management -Advised stretching exercises, appropriate posture for back pain -RTO annually or as needed

## 2024-02-11 ENCOUNTER — TRANSCRIPTION ENCOUNTER (OUTPATIENT)
Age: 29
End: 2024-02-11

## 2024-02-11 ENCOUNTER — NON-APPOINTMENT (OUTPATIENT)
Age: 29
End: 2024-02-11

## 2024-02-11 LAB
25(OH)D3 SERPL-MCNC: 14.3 NG/ML
ALBUMIN SERPL ELPH-MCNC: 4.6 G/DL
ALP BLD-CCNC: 48 U/L
ALT SERPL-CCNC: 9 U/L
ANION GAP SERPL CALC-SCNC: 13 MMOL/L
APPEARANCE: CLEAR
AST SERPL-CCNC: 10 U/L
BASOPHILS # BLD AUTO: 0.01 K/UL
BASOPHILS NFR BLD AUTO: 0.2 %
BILIRUB SERPL-MCNC: 0.2 MG/DL
BILIRUBIN URINE: NEGATIVE
BLOOD URINE: NEGATIVE
BUN SERPL-MCNC: 11 MG/DL
CALCIUM SERPL-MCNC: 9.5 MG/DL
CHLORIDE SERPL-SCNC: 105 MMOL/L
CHOLEST SERPL-MCNC: 221 MG/DL
CO2 SERPL-SCNC: 20 MMOL/L
COLOR: YELLOW
CREAT SERPL-MCNC: 0.92 MG/DL
EGFR: 88 ML/MIN/1.73M2
EOSINOPHIL # BLD AUTO: 0.06 K/UL
EOSINOPHIL NFR BLD AUTO: 1.3 %
ESTIMATED AVERAGE GLUCOSE: 94 MG/DL
GLUCOSE QUALITATIVE U: NEGATIVE MG/DL
GLUCOSE SERPL-MCNC: 87 MG/DL
HBA1C MFR BLD HPLC: 4.9 %
HCT VFR BLD CALC: 41.4 %
HDLC SERPL-MCNC: 49 MG/DL
HGB BLD-MCNC: 13.1 G/DL
IMM GRANULOCYTES NFR BLD AUTO: 0.4 %
KETONES URINE: NEGATIVE MG/DL
LAMOTRIGINE SERPL-MCNC: 8.2 UG/ML
LDLC SERPL CALC-MCNC: 162 MG/DL
LEUKOCYTE ESTERASE URINE: ABNORMAL
LYMPHOCYTES # BLD AUTO: 1.48 K/UL
LYMPHOCYTES NFR BLD AUTO: 31.9 %
MAN DIFF?: NORMAL
MCHC RBC-ENTMCNC: 28.7 PG
MCHC RBC-ENTMCNC: 31.6 GM/DL
MCV RBC AUTO: 90.6 FL
MONOCYTES # BLD AUTO: 0.31 K/UL
MONOCYTES NFR BLD AUTO: 6.7 %
NEUTROPHILS # BLD AUTO: 2.76 K/UL
NEUTROPHILS NFR BLD AUTO: 59.5 %
NITRITE URINE: NEGATIVE
NONHDLC SERPL-MCNC: 172 MG/DL
PH URINE: 6
PLATELET # BLD AUTO: 249 K/UL
POTASSIUM SERPL-SCNC: 4.1 MMOL/L
PROT SERPL-MCNC: 7.3 G/DL
PROTEIN URINE: NEGATIVE MG/DL
RBC # BLD: 4.57 M/UL
RBC # FLD: 14 %
SODIUM SERPL-SCNC: 137 MMOL/L
SPECIFIC GRAVITY URINE: 1.02
TRIGL SERPL-MCNC: 56 MG/DL
TSH SERPL-ACNC: 1.15 UIU/ML
UROBILINOGEN URINE: 0.2 MG/DL
VIT B12 SERPL-MCNC: 672 PG/ML
WBC # FLD AUTO: 4.64 K/UL
ZONISAMIDE SERPL-MCNC: 35.4 UG/ML

## 2024-05-02 ENCOUNTER — NON-APPOINTMENT (OUTPATIENT)
Age: 29
End: 2024-05-02

## 2024-05-02 ENCOUNTER — APPOINTMENT (OUTPATIENT)
Dept: INTERNAL MEDICINE | Facility: CLINIC | Age: 29
End: 2024-05-02
Payer: COMMERCIAL

## 2024-05-02 VITALS
OXYGEN SATURATION: 97 % | WEIGHT: 150 LBS | BODY MASS INDEX: 24.99 KG/M2 | HEIGHT: 65 IN | SYSTOLIC BLOOD PRESSURE: 111 MMHG | HEART RATE: 100 BPM | TEMPERATURE: 98.4 F | DIASTOLIC BLOOD PRESSURE: 77 MMHG

## 2024-05-02 DIAGNOSIS — R05.9 COUGH, UNSPECIFIED: ICD-10-CM

## 2024-05-02 DIAGNOSIS — J02.9 ACUTE PHARYNGITIS, UNSPECIFIED: ICD-10-CM

## 2024-05-02 PROCEDURE — 36415 COLL VENOUS BLD VENIPUNCTURE: CPT

## 2024-05-02 PROCEDURE — 99213 OFFICE O/P EST LOW 20 MIN: CPT | Mod: 25

## 2024-05-02 RX ORDER — AZITHROMYCIN 250 MG/1
250 TABLET, FILM COATED ORAL
Qty: 1 | Refills: 0 | Status: ACTIVE | COMMUNITY
Start: 2024-05-02 | End: 1900-01-01

## 2024-05-02 RX ORDER — DEXAMETHASONE 6 MG/1
6 TABLET ORAL
Qty: 1 | Refills: 0 | Status: ACTIVE | COMMUNITY
Start: 2024-05-02 | End: 1900-01-01

## 2024-05-02 RX ORDER — DEXAMETHASONE 4 MG/1
4 TABLET ORAL DAILY
Qty: 1 | Refills: 0 | Status: ACTIVE | COMMUNITY
Start: 2024-05-02 | End: 1900-01-01

## 2024-05-02 NOTE — ASSESSMENT
[FreeTextEntry1] : Symptom most likely viral. -Z-pack and Prednisone provided. If no improvement to call office. -Check for Mono and Pertussis. -Respiratory panel ordered.

## 2024-05-02 NOTE — PHYSICAL EXAM
[Normal] : normal rate, regular rhythm, normal S1 and S2 and no murmur heard [de-identified] : Pustular on back of throat appreciated.

## 2024-05-02 NOTE — ADDENDUM
[FreeTextEntry1] : I, Latasha Ibanez, acted as a scribe on behalf of Dr. Kiet Carcamo MD, on 05/02/2024.   All medical entries made by the scribe were at my, Dr. Kiet Carcamo MD, direction and personally dictated by me on 05/02/2024. I have reviewed the chart and agree that the record accurately reflects my personal performance of the history, physical exam, assessment and plan. I have also personally directed, reviewed, and agreed with the chart.

## 2024-05-02 NOTE — HISTORY OF PRESENT ILLNESS
[FreeTextEntry8] : Patient c/o 1-week Hx of sore throat and dry cough. described as consistent cough. Notes cough has been getting worse, particularly AM and PM. She has tried warm drinks. Pt has tried Robitussin and lozenges with minimal relief. She went to urgent care Tuesday impression of erythema and pus appreciated on throat.  She tested negative for Covid, flu, strep. She was prescribed with Amoxicillin 5/1-5/2 and received a call to stop. Denies any difficulty swallowing, SOB, body aches, night sweats, postnasal drip. Denies any sick contacts, local/foreign travel.

## 2024-05-06 ENCOUNTER — TRANSCRIPTION ENCOUNTER (OUTPATIENT)
Age: 29
End: 2024-05-06

## 2024-05-07 LAB
BORDETELLA PARAPERTUSSIS DNA: NOT DETECTED
BORDETELLA PERTUSSIS DNA: NOT DETECTED
EBV EA AB SER IA-ACNC: <5 U/ML
EBV EA AB TITR SER IF: POSITIVE
EBV EA IGG SER QL IA: 208 U/ML
EBV EA IGG SER-ACNC: NEGATIVE
EBV EA IGM SER IA-ACNC: NEGATIVE
EBV PATRN SPEC IB-IMP: NORMAL
EBV VCA IGG SER IA-ACNC: >750 U/ML
EBV VCA IGM SER QL IA: 23.9 U/ML
EPSTEIN-BARR VIRUS CAPSID ANTIGEN IGG: POSITIVE

## 2024-05-10 DIAGNOSIS — R09.81 NASAL CONGESTION: ICD-10-CM

## 2024-05-10 RX ORDER — AZELASTINE HYDROCHLORIDE 137 UG/1
137 SPRAY, METERED NASAL
Qty: 1 | Refills: 1 | Status: ACTIVE | COMMUNITY
Start: 2024-05-10 | End: 1900-01-01

## 2024-10-30 ENCOUNTER — APPOINTMENT (OUTPATIENT)
Dept: UROLOGY | Facility: CLINIC | Age: 29
End: 2024-10-30

## 2024-11-05 ENCOUNTER — APPOINTMENT (OUTPATIENT)
Dept: UROLOGY | Facility: CLINIC | Age: 29
End: 2024-11-05
Payer: COMMERCIAL

## 2024-11-05 ENCOUNTER — NON-APPOINTMENT (OUTPATIENT)
Age: 29
End: 2024-11-05

## 2024-11-05 VITALS
TEMPERATURE: 98.8 F | BODY MASS INDEX: 26.33 KG/M2 | HEIGHT: 65 IN | WEIGHT: 158 LBS | DIASTOLIC BLOOD PRESSURE: 74 MMHG | OXYGEN SATURATION: 98 % | SYSTOLIC BLOOD PRESSURE: 122 MMHG | HEART RATE: 99 BPM

## 2024-11-05 DIAGNOSIS — Z87.442 PERSONAL HISTORY OF URINARY CALCULI: ICD-10-CM

## 2024-11-05 DIAGNOSIS — R10.9 UNSPECIFIED ABDOMINAL PAIN: ICD-10-CM

## 2024-11-05 PROCEDURE — G2211 COMPLEX E/M VISIT ADD ON: CPT | Mod: NC

## 2024-11-05 PROCEDURE — 99213 OFFICE O/P EST LOW 20 MIN: CPT

## 2024-11-05 PROCEDURE — 76775 US EXAM ABDO BACK WALL LIM: CPT

## 2024-11-07 LAB
AMORPHOUS PRECEPITATE CRYSTALS: PRESENT
APPEARANCE: CLEAR
BACTERIA: NEGATIVE /HPF
BILIRUBIN URINE: NEGATIVE
BLOOD URINE: NEGATIVE
CALCIUM OXALATE CRYSTALS: PRESENT
CAST: 0 /LPF
COLOR: YELLOW
EPITHELIAL CELLS: 2 /HPF
GLUCOSE QUALITATIVE U: NEGATIVE MG/DL
KETONES URINE: NEGATIVE MG/DL
LEUKOCYTE ESTERASE URINE: NEGATIVE
MICROSCOPIC-UA: NORMAL
NITRITE URINE: NEGATIVE
PH URINE: 6.5
PROTEIN URINE: NEGATIVE MG/DL
RED BLOOD CELLS URINE: 2 /HPF
REVIEW: NORMAL
SPECIFIC GRAVITY URINE: 1.02
UROBILINOGEN URINE: 0.2 MG/DL
WHITE BLOOD CELLS URINE: 0 /HPF

## 2024-11-08 LAB — BACTERIA UR CULT: ABNORMAL

## 2025-02-11 ENCOUNTER — APPOINTMENT (OUTPATIENT)
Dept: UROLOGY | Facility: CLINIC | Age: 30
End: 2025-02-11

## 2025-03-12 ENCOUNTER — APPOINTMENT (OUTPATIENT)
Dept: DERMATOLOGY | Facility: CLINIC | Age: 30
End: 2025-03-12
Payer: COMMERCIAL

## 2025-03-12 VITALS — BODY MASS INDEX: 24.13 KG/M2 | WEIGHT: 145 LBS

## 2025-03-12 DIAGNOSIS — L71.9 ROSACEA, UNSPECIFIED: ICD-10-CM

## 2025-03-12 PROCEDURE — G2211 COMPLEX E/M VISIT ADD ON: CPT | Mod: NC

## 2025-03-12 PROCEDURE — 99204 OFFICE O/P NEW MOD 45 MIN: CPT

## 2025-03-12 RX ORDER — SODIUM SULFACETAMIDE 100 MG/G
10 LIQUID TOPICAL
Qty: 1 | Refills: 1 | Status: ACTIVE | COMMUNITY
Start: 2025-03-12 | End: 1900-01-01

## 2025-03-12 RX ORDER — METRONIDAZOLE 7.5 MG/G
0.75 CREAM TOPICAL
Qty: 1 | Refills: 3 | Status: ACTIVE | COMMUNITY
Start: 2025-03-12 | End: 1900-01-01

## 2025-03-12 RX ORDER — TACROLIMUS 1 MG/G
0.1 OINTMENT TOPICAL
Qty: 1 | Refills: 1 | Status: ACTIVE | COMMUNITY
Start: 2025-03-12 | End: 1900-01-01

## 2025-03-12 RX ORDER — IVERMECTIN 10 MG/G
1 CREAM TOPICAL
Qty: 1 | Refills: 2 | Status: ACTIVE | COMMUNITY
Start: 2025-03-12 | End: 1900-01-01

## 2025-04-10 NOTE — ASU PREOP CHECKLIST - ISOLATION PRECAUTIONS
Continued Stay SW/CM Assessment/Plan of Care Note       Active Substitute Decision Maker (SDM)       ANTONIO XANDER Health Care Agent 1 - Son   Primary Phone: [phone] (Mobile)  Home Phone: [phone]  Work Phone: [phone]                     Progress note:  SW/CM following for discharge planning.     Community Care Team:   RN: Toyin Peña [phone]  CM: Jeff Santamaria [phone]  Fax:     SW made aware that patient needs transportation assistance at discharge.     SW left a voicemail for Community Care team to discuss patient's needs and transportation needs.     Patient to discharge home with family when medically ready for discharge.     SW/CM to continue to follow for discharge planning.     Haley Chavarria, MSW   Cell [phone]  On weekends, please call [phone]      See SW/CM flowsheets for other objective data.    Disposition Recommendations:  Preliminary discharge destination: Planned Discharge Destination: Home self-care  SW/CM recommendation for discharge: Home    Destination Pharmacy:        Discharge Plan/Needs:     Continued Care and Services - Admitted Since 4/8/2025    No active coordination exists for this encounter.               Prior To Hospitalization:    Living Situation: Adult children and residing at House    .  Support Systems: Family members   Home Devices/Equipment: Mobility assist device, Shower chair, Blood pressure monitor            Mobility Assist Devices: Wheelchair, Gait belt, Sit to stand lift   Type of Service Prior to Hospitalization: , Home care               Patient/Family discharge goal (s):  Home     Resources provided:           Prior Function                Current Function  Last Filed Values       None            Therapy Recommendations for Discharge:   PT:      Last Filed Values       None          OT:       Last Filed Values       None          SLP:    Last Filed Values       None            Mobility Equipment Recommended for  Discharge:        Barriers to Discharge  Identified Barriers to Discharge/Transition Planning: Medical necessity for acute care                   none

## 2025-05-02 ENCOUNTER — APPOINTMENT (OUTPATIENT)
Dept: INTERNAL MEDICINE | Facility: CLINIC | Age: 30
End: 2025-05-02
Payer: COMMERCIAL

## 2025-05-02 VITALS
HEART RATE: 94 BPM | DIASTOLIC BLOOD PRESSURE: 64 MMHG | OXYGEN SATURATION: 97 % | WEIGHT: 153 LBS | SYSTOLIC BLOOD PRESSURE: 100 MMHG | BODY MASS INDEX: 25.49 KG/M2 | TEMPERATURE: 98.1 F | HEIGHT: 65 IN

## 2025-05-02 DIAGNOSIS — Z00.00 ENCOUNTER FOR GENERAL ADULT MEDICAL EXAMINATION W/OUT ABNORMAL FINDINGS: ICD-10-CM

## 2025-05-02 DIAGNOSIS — Z23 ENCOUNTER FOR IMMUNIZATION: ICD-10-CM

## 2025-05-02 DIAGNOSIS — G40.B19 JUVENILE MYOCLONIC EPILEPSY, INTRACTABLE, W/OUT STATUS EPILEPTICUS: ICD-10-CM

## 2025-05-02 DIAGNOSIS — Z87.898 PERSONAL HISTORY OF OTHER SPECIFIED CONDITIONS: ICD-10-CM

## 2025-05-02 PROCEDURE — 99395 PREV VISIT EST AGE 18-39: CPT

## 2025-05-02 PROCEDURE — 36415 COLL VENOUS BLD VENIPUNCTURE: CPT

## 2025-05-09 ENCOUNTER — TRANSCRIPTION ENCOUNTER (OUTPATIENT)
Age: 30
End: 2025-05-09

## 2025-05-09 DIAGNOSIS — R79.89 OTHER SPECIFIED ABNORMAL FINDINGS OF BLOOD CHEMISTRY: ICD-10-CM

## 2025-05-09 LAB
25(OH)D3 SERPL-MCNC: 13 NG/ML
ALBUMIN SERPL ELPH-MCNC: 4.8 G/DL
ALP BLD-CCNC: 59 U/L
ALT SERPL-CCNC: 16 U/L
ANION GAP SERPL CALC-SCNC: 14 MMOL/L
APPEARANCE: CLEAR
AST SERPL-CCNC: 17 U/L
BASOPHILS # BLD AUTO: 0.03 K/UL
BASOPHILS NFR BLD AUTO: 0.8 %
BILIRUB SERPL-MCNC: 0.2 MG/DL
BILIRUBIN URINE: NEGATIVE
BLOOD URINE: NEGATIVE
BUN SERPL-MCNC: 10 MG/DL
CALCIUM SERPL-MCNC: 9.9 MG/DL
CHLORIDE SERPL-SCNC: 105 MMOL/L
CHOLEST SERPL-MCNC: 233 MG/DL
CO2 SERPL-SCNC: 20 MMOL/L
COLOR: YELLOW
CREAT SERPL-MCNC: 0.9 MG/DL
EGFRCR SERPLBLD CKD-EPI 2021: 90 ML/MIN/1.73M2
EOSINOPHIL # BLD AUTO: 0.15 K/UL
EOSINOPHIL NFR BLD AUTO: 3.9 %
ESTIMATED AVERAGE GLUCOSE: 97 MG/DL
GLUCOSE QUALITATIVE U: NEGATIVE MG/DL
GLUCOSE SERPL-MCNC: 85 MG/DL
HBA1C MFR BLD HPLC: 5 %
HCT VFR BLD CALC: 40.4 %
HDLC SERPL-MCNC: 41 MG/DL
HGB BLD-MCNC: 12.9 G/DL
IMM GRANULOCYTES NFR BLD AUTO: 0.3 %
KETONES URINE: NEGATIVE MG/DL
LACOSAMIDE (VIMPAT): <0.5 UG/ML
LAMOTRIGINE SERPL-MCNC: 11.6 UG/ML
LDLC SERPL-MCNC: 174 MG/DL
LEUKOCYTE ESTERASE URINE: NEGATIVE
LYMPHOCYTES # BLD AUTO: 1.11 K/UL
LYMPHOCYTES NFR BLD AUTO: 28.7 %
MAN DIFF?: NORMAL
MCHC RBC-ENTMCNC: 29.1 PG
MCHC RBC-ENTMCNC: 31.9 G/DL
MCV RBC AUTO: 91.2 FL
MONOCYTES # BLD AUTO: 0.31 K/UL
MONOCYTES NFR BLD AUTO: 8 %
NEUTROPHILS # BLD AUTO: 2.26 K/UL
NEUTROPHILS NFR BLD AUTO: 58.3 %
NITRITE URINE: NEGATIVE
NONHDLC SERPL-MCNC: 192 MG/DL
PH URINE: 6
PLATELET # BLD AUTO: 240 K/UL
POTASSIUM SERPL-SCNC: 4.2 MMOL/L
PROT SERPL-MCNC: 7.5 G/DL
PROTEIN URINE: NORMAL MG/DL
RBC # BLD: 4.43 M/UL
RBC # FLD: 14.1 %
SODIUM SERPL-SCNC: 139 MMOL/L
SPECIFIC GRAVITY URINE: 1.02
TRIGL SERPL-MCNC: 96 MG/DL
TSH SERPL-ACNC: 1.16 UIU/ML
UROBILINOGEN URINE: 0.2 MG/DL
VIT B12 SERPL-MCNC: 762 PG/ML
WBC # FLD AUTO: 3.87 K/UL

## 2025-05-09 RX ORDER — ERGOCALCIFEROL 1.25 MG/1
1.25 MG CAPSULE ORAL
Qty: 12 | Refills: 1 | Status: ACTIVE | COMMUNITY
Start: 2025-05-09 | End: 1900-01-01

## 2025-06-23 ENCOUNTER — APPOINTMENT (OUTPATIENT)
Dept: DERMATOLOGY | Facility: CLINIC | Age: 30
End: 2025-06-23
Payer: COMMERCIAL

## 2025-06-23 VITALS — BODY MASS INDEX: 25.63 KG/M2 | WEIGHT: 154 LBS

## 2025-06-23 PROCEDURE — 99213 OFFICE O/P EST LOW 20 MIN: CPT
